# Patient Record
Sex: MALE | Race: WHITE | NOT HISPANIC OR LATINO
[De-identification: names, ages, dates, MRNs, and addresses within clinical notes are randomized per-mention and may not be internally consistent; named-entity substitution may affect disease eponyms.]

---

## 2021-01-12 ENCOUNTER — TRANSCRIPTION ENCOUNTER (OUTPATIENT)
Age: 66
End: 2021-01-12

## 2021-01-12 ENCOUNTER — EMERGENCY (EMERGENCY)
Facility: HOSPITAL | Age: 66
LOS: 1 days | Discharge: HOME | End: 2021-01-12
Attending: EMERGENCY MEDICINE | Admitting: EMERGENCY MEDICINE
Payer: COMMERCIAL

## 2021-01-12 VITALS
DIASTOLIC BLOOD PRESSURE: 75 MMHG | SYSTOLIC BLOOD PRESSURE: 128 MMHG | OXYGEN SATURATION: 97 % | HEART RATE: 75 BPM | TEMPERATURE: 98 F | RESPIRATION RATE: 18 BRPM

## 2021-01-12 VITALS
TEMPERATURE: 98 F | HEART RATE: 84 BPM | DIASTOLIC BLOOD PRESSURE: 75 MMHG | SYSTOLIC BLOOD PRESSURE: 133 MMHG | OXYGEN SATURATION: 95 % | RESPIRATION RATE: 16 BRPM | WEIGHT: 315 LBS | HEIGHT: 74 IN

## 2021-01-12 DIAGNOSIS — R51.9 HEADACHE, UNSPECIFIED: ICD-10-CM

## 2021-01-12 DIAGNOSIS — R05 COUGH: ICD-10-CM

## 2021-01-12 DIAGNOSIS — R50.9 FEVER, UNSPECIFIED: ICD-10-CM

## 2021-01-12 DIAGNOSIS — Z20.822 CONTACT WITH AND (SUSPECTED) EXPOSURE TO COVID-19: ICD-10-CM

## 2021-01-12 DIAGNOSIS — M79.10 MYALGIA, UNSPECIFIED SITE: ICD-10-CM

## 2021-01-12 LAB — SARS-COV-2 RNA SPEC QL NAA+PROBE: DETECTED

## 2021-01-12 PROCEDURE — 99283 EMERGENCY DEPT VISIT LOW MDM: CPT

## 2021-01-12 PROCEDURE — U0005: CPT

## 2021-01-12 PROCEDURE — 82962 GLUCOSE BLOOD TEST: CPT

## 2021-01-12 PROCEDURE — 99284 EMERGENCY DEPT VISIT MOD MDM: CPT

## 2021-01-12 PROCEDURE — U0003: CPT

## 2021-01-12 NOTE — ED PROVIDER NOTE - OBJECTIVE STATEMENT
66 y/o M with PMHx DM, HTN, HLD, anxiety, MICHELLE, presents to the ED c/o substernal burning, body aches, ear fullness, and cough. Pt states he went grocery shopping with a friend 5 days ago and afterwards began to experience what felt like the beginning of a cold. Pt then decided to get a covid test yesterday and was found to be positive. States after he was tested, he began to experience subjective fever and chills, as well as the symptoms noted above. Has been taking tylenol since onset of his symptoms, with some improvement. Pt spoke to his PMD earlier today, who suggested pt come to the ED for antibody treatment. Denies change in BMs or urinary habits, SOB, nausea, vomiting, abdominal pain, or lower extremity numbness or tingling. 64 y/o M with PMHx DM, HTN, HLD, anxiety, MICHELLE, presents to the ED c/o  body aches, congestion, dry cough and headache x 5 days.   Pt had + exposure on Thursday, tested + himself at outpt clinic on Sunday.  Has been taking tylenol since onset of his symptoms, with some improvement.  He had telehealth visit with PMD Dr. Nathan and was told his oxygen was low to come to ED, pt under impression he is here for monoclonal antibodies.  Denies chills, dizziness, fainting, chest pain, sob, abd pain, nvd, urinary sxs, leg pain/swelling, fall/trauma

## 2021-01-12 NOTE — ED PROVIDER NOTE - ATTENDING CONTRIBUTION TO CARE
65 year old male w hx of HTN, HLD, DM presents to ED with concern for body aches, HA and fever over the past 5 days.  Patient notes known COVID exposure of Thursday and later tested positive.  Notes he had tele health visit today and was sent to ED for monoclonal ab infusion.  Patient is taking tylenol with mild improvement in symptoms.  On exam, patient is non toxic in appearance.  HRRR, lungs clear.  Abd soft, NT.  Will check amb o2 sat, and plan to enroll for monoclonal ab approval.  Will dispo accordingly.

## 2021-01-12 NOTE — ED ADULT NURSE NOTE - CAS TRG GENERAL NORM CIRC DET
Strong peripheral pulses/Capillary refill less/equal to 2 seconds

## 2021-01-12 NOTE — ED ADULT NURSE NOTE - CHPI ED NUR SYMPTOMS NEG
no abdominal pain/no chills/no decreased eating/drinking/no diarrhea/no fever/no rash/no shortness of breath/no vomiting

## 2021-01-12 NOTE — ED PROVIDER NOTE - PHYSICAL EXAMINATION
Vitals reviewed  Gen: well appearing, nad, speaking in full sentences  Skin: wwp, no rash/lesions  HEENT: ncat, eomi, mmm  CV: rrr, no audible m/r/g  Resp: symmetrical expansion, ctab, no w/r/r  Abd: nondistended, soft/nt  Ext: FROM throughout, no peripheral edema  Neuro: alert/oriented, no focal deficits, steady gait Vitals reviewed  Gen: well appearing obese M, nad, speaking in full sentences- no dypsnea/hypoxia   Skin: wwp, no rash/lesions  HEENT: ncat, eomi, mmm  CV: rrr, no audible m/r/g  Resp: symmetrical expansion, ctab, no w/r/r  Abd: nondistended, soft, nontender, no rebound/guarding   Ext: FROM throughout, no peripheral edema/calf ttp   Neuro: alert/oriented, no focal deficits, steady gait

## 2021-01-12 NOTE — ED ADULT NURSE NOTE - OBJECTIVE STATEMENT
Pt presents to ED c/o headache. Pt reports headache, body aches, cough. Tested positive for COVID-19 yesterday. Denies chest pain, shortness of breath, fevers, n/v/d. Speaking in clear full sentences, NAD.

## 2021-01-12 NOTE — ED PROVIDER NOTE - NSFOLLOWUPINSTRUCTIONS_ED_ALL_ED_FT
You have covid and need to continue home medications and monitor your oxygen levels.  If your oxygen drops below 92% return to ED.  You were enrolled in monoclonal antibodies- if you qualify you will be contacted.      PLEASE REST AND REMAIN HYDRATED (EG, GATORADE, PEDIALYTE, ETC). TYLENOL AS NEEDED FOR FEVER (>100.4F/>38C)  PLEASE FOLLOW-UP WITH YOUR PCP IN 1-2 DAYS    PLEASE RETURN TO THE EMERGENCY DEPARTMENT IF SOMNOLENCE, CHEST PAIN, SHORTNESS OF BREATH, ABDOMINAL PAIN, VOMITING, OTHER CONCERNING SYMPTOMS    Avoid highly populated and public areas. Avoid using public transportation, ride-sharing, or taxis.   As much as possible, separate yourself from other people in your home. If you can, you should sleep in a room away from other people in your home. Also, you should use a separate bathroom, if available.   Wear the supplied mask whenever you are around other people.   If you have a non-urgent medical appointment, please reschedule for a later date. If the appointment is urgent, please call the healthcare provider first   Wash your hands often with soap and water for at least 15 to 20 seconds or clean your hands with an alcohol-based hand  that contains 60 to 95% alcohol, covering all surfaces of your hands and rubbing them together until they feel dry. Soap and water should be used preferentially if hands are visibly dirty.   Cover your mouth and nose with a tissue when you cough or sneeze. Throw used tissues in a lined trash can; immediately wash your hands.   Avoid touching your eyes, nose, and mouth with your hands.   Avoid sharing personal household items. You should not share dishes, drinking glasses, cups, eating utensils, towels, or bedding with other people or pets in your home. After using these items, they should be washed thoroughly with soap and water.   Clean and disinfect all “high-touch” surfaces every day. High touch surfaces include counters, tabletops, doorknobs, light switches, remote controls, bathroom fixtures, toilets, phones, keyboards, tablets, and bedside tables. Also, clean any surfaces that may have blood, stool, or body fluids on them.

## 2021-01-12 NOTE — ED PROVIDER NOTE - CHPI ED SYMPTOMS NEG
no change in BMs, no change in urinary habits, no nausea, no numbness, no tingling/no abdominal pain/no shortness of breath/no vomiting

## 2021-01-12 NOTE — ED PROVIDER NOTE - PATIENT PORTAL LINK FT
You can access the FollowMyHealth Patient Portal offered by [facility] by registering at the following website: [email or web address]. By joining [facility]’s FollowMyHealth portal, you will also be able to view your health information using other applications (apps) compatible with our system.

## 2021-01-12 NOTE — ED PROVIDER NOTE - CLINICAL SUMMARY MEDICAL DECISION MAKING FREE TEXT BOX
66 y/o M with PMHx DM, HTN, HLD, anxiety, MICHELLE, presents to the ED c/o  body aches, congestion, dry cough and headache x 5 days; known covid +.  pt has no sob/chest pain.  pt afebrile, no hypoxia/dyspnea, ambulatory sat 97%, lungs ctab, abd soft/nt, no peripheral edema.  d/w PMD Dr. Nathan, pt does not meet admission for covid, will enroll for monocolonal antibody infusion- pt/PMD informed he will be contacted if accepted.  he can f/u outpt w/ pmd.  pt given O2 monitor and instructed to return if O2 drops below 92%.  discussed supportive parameters and strict return precautions.

## 2021-01-13 ENCOUNTER — TRANSCRIPTION ENCOUNTER (OUTPATIENT)
Age: 66
End: 2021-01-13

## 2021-01-15 ENCOUNTER — APPOINTMENT (OUTPATIENT)
Dept: DISASTER EMERGENCY | Facility: HOSPITAL | Age: 66
End: 2021-01-15

## 2021-01-15 ENCOUNTER — OUTPATIENT (OUTPATIENT)
Dept: OUTPATIENT SERVICES | Facility: HOSPITAL | Age: 66
LOS: 1 days | End: 2021-01-15
Payer: COMMERCIAL

## 2021-01-15 VITALS
SYSTOLIC BLOOD PRESSURE: 138 MMHG | OXYGEN SATURATION: 100 % | WEIGHT: 315 LBS | HEIGHT: 74 IN | HEART RATE: 80 BPM | DIASTOLIC BLOOD PRESSURE: 79 MMHG | TEMPERATURE: 100 F | RESPIRATION RATE: 16 BRPM

## 2021-01-15 VITALS
RESPIRATION RATE: 16 BRPM | OXYGEN SATURATION: 97 % | TEMPERATURE: 99 F | HEART RATE: 82 BPM | DIASTOLIC BLOOD PRESSURE: 69 MMHG | SYSTOLIC BLOOD PRESSURE: 137 MMHG

## 2021-01-15 DIAGNOSIS — U07.1 COVID-19: ICD-10-CM

## 2021-01-15 PROBLEM — I10 ESSENTIAL (PRIMARY) HYPERTENSION: Chronic | Status: ACTIVE | Noted: 2021-01-12

## 2021-01-15 PROBLEM — G47.33 OBSTRUCTIVE SLEEP APNEA (ADULT) (PEDIATRIC): Chronic | Status: ACTIVE | Noted: 2021-01-12

## 2021-01-15 PROBLEM — E78.5 HYPERLIPIDEMIA, UNSPECIFIED: Chronic | Status: ACTIVE | Noted: 2021-01-12

## 2021-01-15 PROBLEM — E11.9 TYPE 2 DIABETES MELLITUS WITHOUT COMPLICATIONS: Chronic | Status: ACTIVE | Noted: 2021-01-12

## 2021-01-15 PROCEDURE — M0243: CPT

## 2021-01-16 ENCOUNTER — TRANSCRIPTION ENCOUNTER (OUTPATIENT)
Age: 66
End: 2021-01-16

## 2021-01-20 ENCOUNTER — TRANSCRIPTION ENCOUNTER (OUTPATIENT)
Age: 66
End: 2021-01-20

## 2021-01-21 ENCOUNTER — EMERGENCY (EMERGENCY)
Facility: HOSPITAL | Age: 66
LOS: 1 days | Discharge: HOME | End: 2021-01-21
Attending: EMERGENCY MEDICINE | Admitting: EMERGENCY MEDICINE
Payer: COMMERCIAL

## 2021-01-21 VITALS
DIASTOLIC BLOOD PRESSURE: 71 MMHG | OXYGEN SATURATION: 98 % | SYSTOLIC BLOOD PRESSURE: 134 MMHG | RESPIRATION RATE: 18 BRPM | TEMPERATURE: 99 F | HEART RATE: 63 BPM

## 2021-01-21 VITALS
WEIGHT: 315 LBS | TEMPERATURE: 98 F | DIASTOLIC BLOOD PRESSURE: 79 MMHG | HEIGHT: 74 IN | SYSTOLIC BLOOD PRESSURE: 119 MMHG | OXYGEN SATURATION: 97 % | RESPIRATION RATE: 16 BRPM | HEART RATE: 76 BPM

## 2021-01-21 DIAGNOSIS — R05 COUGH: ICD-10-CM

## 2021-01-21 DIAGNOSIS — U07.1 COVID-19: ICD-10-CM

## 2021-01-21 PROCEDURE — 87040 BLOOD CULTURE FOR BACTERIA: CPT

## 2021-01-21 PROCEDURE — U0003: CPT

## 2021-01-21 PROCEDURE — 84484 ASSAY OF TROPONIN QUANT: CPT

## 2021-01-21 PROCEDURE — 99283 EMERGENCY DEPT VISIT LOW MDM: CPT | Mod: 25

## 2021-01-21 PROCEDURE — 80053 COMPREHEN METABOLIC PANEL: CPT

## 2021-01-21 PROCEDURE — 85610 PROTHROMBIN TIME: CPT

## 2021-01-21 PROCEDURE — 82803 BLOOD GASES ANY COMBINATION: CPT

## 2021-01-21 PROCEDURE — 85730 THROMBOPLASTIN TIME PARTIAL: CPT

## 2021-01-21 PROCEDURE — 85025 COMPLETE CBC W/AUTO DIFF WBC: CPT

## 2021-01-21 PROCEDURE — 83605 ASSAY OF LACTIC ACID: CPT

## 2021-01-21 PROCEDURE — 71045 X-RAY EXAM CHEST 1 VIEW: CPT

## 2021-01-21 PROCEDURE — 83880 ASSAY OF NATRIURETIC PEPTIDE: CPT

## 2021-01-21 PROCEDURE — 99284 EMERGENCY DEPT VISIT MOD MDM: CPT

## 2021-01-21 PROCEDURE — 71045 X-RAY EXAM CHEST 1 VIEW: CPT | Mod: 26

## 2021-01-21 PROCEDURE — 36415 COLL VENOUS BLD VENIPUNCTURE: CPT

## 2021-01-21 PROCEDURE — U0005: CPT

## 2021-01-21 RX ORDER — ACETAMINOPHEN 500 MG
650 TABLET ORAL ONCE
Refills: 0 | Status: COMPLETED | OUTPATIENT
Start: 2021-01-21 | End: 2021-01-21

## 2021-01-21 RX ADMIN — Medication 650 MILLIGRAM(S): at 13:34

## 2021-01-21 NOTE — ED PROVIDER NOTE - CLINICAL SUMMARY MEDICAL DECISION MAKING FREE TEXT BOX
65M PMH HTN, DM2, HLD, MICHELLE on CPAP p/w CP. Pt COVID+, on day 11 of symptoms including f/c, cough, body aches, mild diarrhea, nausea. Also w/ intermittent CP/SOB, worse when he goes to sleep. Had outpt Casirivimab/Imdevimab infusion. Came to ED today for persistent symptoms as well as just feeling overwhelmed and being unable to sleep well. Also w/ intermittent diffuse weakness, none currently. Very mild tachypnea, other vitals wnl. Exam as above. Already has home pulse ox.   ddx: Likely 2/2 COVID.   Labs, CXR, observe in ED/reassess.

## 2021-01-21 NOTE — ED PROVIDER NOTE - PATIENT PORTAL LINK FT
You can access the FollowMyHealth Patient Portal offered by [facility] by registering at the following website: [email or web address]. By joining [facility]’s FollowMyHealth portal, you will also be able to view your health information using other applications (apps) compatible with our system.

## 2021-01-21 NOTE — ED PROVIDER NOTE - PROGRESS NOTE DETAILS
[facility]: labs grossly wnl. Pt overall feeling better. Able to ambulate in ED - no hypoxia, does get minimal tachypnea w/ ambulating that resolves w/ rest. Observed in ED for several hrs. Not requiring O2. Body pain is much improved. Clinically no indication for further emergent ED workup or hospitalization at this time. Comfortable for dc, outpt f/u.

## 2021-01-21 NOTE — ED PROVIDER NOTE - EKG #1 DATE/TIME
21-Jan-2021 11:54
Breathing spontaneous and unlabored. Breath sounds clear and equal bilaterally with regular rhythm.

## 2021-01-21 NOTE — ED ADULT NURSE NOTE - OBJECTIVE STATEMENT
Pt presents reporting shortness of breath, insomnia, bilateral arm pain for the last 11 days since being diagnosed with covid19 11 days ago. Pt received monoclonal antibody therapy 8 days ago.

## 2021-01-21 NOTE — ED PROVIDER NOTE - OBJECTIVE STATEMENT
65M PMH HTN, DM2, HLD, MICHELLE on CPAP p/w CP. Pt COVID+, on day 11 of symptoms including f/c, cough, body aches, mild diarrhea, nausea. Also w/ intermittent CP/SOB, worse when he goes to sleep. Had outpt Casirivimab/Imdevimab infusion. Came to ED today for persistent symptoms as well as just feeling overwhelmed and being unable to sleep well. Also w/ intermittent diffuse weakness, none currently.   Denies HA, vision changes, vomiting, rashes, black/bloody stool, focal weakness/numbness, urinary complaints.

## 2021-01-21 NOTE — ED PROVIDER NOTE - NSFOLLOWUPINSTRUCTIONS_ED_ALL_ED_FT
Can take tylenol 650mg every 6hrs as needed for pain or fever.  Stay well hydrated and make sure you eat even if you have a poor appetite.   You may feel slightly worse, that is normal.  You should RETURN IMMEDIATELY to ER for persistent vomit, uncontrolled or worsening abdominal pain, worsening breathing (sometimes manifests as breathing heavier or quicker, unable to speak full sentences), worsening lightheaded, or overall feeling much worse. You may need additional work up or interventions at that time.   It is important to follow up with your primary doctor within 1-2 days.  You are contagious and have coronavirus. Take appropriate precautions - quarantine yourself  (avoid contact with other people, wash hands thoroughly with soap and water) for at least 2 weeks (and until 1 week after fevers resolve or 3 days after all symptoms resolve.)       Viral Respiratory Infection    A viral respiratory infection is an illness that affects parts of the body used for breathing, like the lungs, nose, and throat. It is caused by a germ called a virus. Symptoms can include runny nose, coughing, sneezing, fatigue, body aches, sore throat, fever, or headache. Over the counter medicine can be used to manage the symptoms but the infection typically goes away on its own in 5 to 10 days.     SEEK IMMEDIATE MEDICAL CARE IF YOU HAVE ANY OF THE FOLLOWING SYMPTOMS: shortness of breath, chest pain, fever over 10 days, or lightheadedness/dizziness.     Novel Coronavirus (COVID-19) Patient Discharge Packet    What is a coronavirus?  Coronaviruses are a large family of viruses that cause illnesses ranging from the common cold to more severe diseases such as Middle East Respiratory Syndrome (MERS) and Severe Acute Respiratory Syndrome (SARS).    What is Novel Coronavirus (COVID-19)?  COVID-19 is a new strain of Coronavirus that has not been previously identified in humans. COVID-19 was identified in [facility] in December 2019 (COVID-19). COVID-19 has since been identified outside of [place], in a growing number of countries internationally, including the [place].    Where can I find the most recent information about COVID-19?  The Centers for Disease Control and Prevention (CDC)is closely monitoring the outbreak caused by the COVID-19. For the latest information about COVID-19, visit the CDC website at https://www.cdc.gov/coronavirus/index.html    How are coronaviruses spread?  Coronaviruses can be transmitted from person-to-person, usually after close contact with an infected person, for example, in a household, workplace, or healthcare setting via droplets that become airborne after a cough or sneeze by an affected person. These droplets can then infect a nearby person. It is likely transmission also occurs by touching recently contaminated surfaces.     What are the symptoms of coronavirus infection?  It depends on the virus, but common signs include fever and/or respiratory symptoms such as cough and shortness of breath. In more severe cases, infection can cause pneumonia, severe acute respiratory syndrome, kidney failure and even death. Fortunately, most cases of COVID-19 have an illness no different than the influenza “flu”. With a majority of these patients having mild symptoms and overall mortality which appears to be not much different than the flu.    Is there a treatment for a COVID-19?  There is no specific treatment for disease caused by COVID-19. However, many of the symptoms can be treated based on the patient’s clinical condition. Supportive care for infected persons can be highly effective.    What can I do to protect myself?  Washing  your  hands, covering  your  cough,  and  disinfecting  surfaces are  the  best  precautionary measures.  It  is  also  advisable  to  avoid close  contact  with  anyone  showing  symptoms  of  respiratory illness  such  as  coughing  and  sneezing.  Those  with  symptoms  should  wear  a surgical  mask  when around others.    What can I do to protect those around me?  If  you  have  been  identified  as  someone  who  may  be  infected  with  COVID-19,  we  recommend  you follow the self-isolation procedures outlined below to protect those around you and limit the spread of this virus.     Recommendations for Patients Advised to Self-Isolate for Possible COVID-19 Exposure    We recommend the below precautionary steps from now until 14 days from when you returned from your travel or date of your last known possible contact (An additional 14 days quarantine from when your symptoms resolve):  Do not go to work, school, or public areas. Avoid using public transportation, ride-sharing, or taxis.  As much as possible, separate yourself from other people in your home. If you can, you should stay in a room and away from other people in your home. Also, you should use a separate bathroom, if available.  Wear the supplied mask whenever you are around other people.   If you have a non-urgent medical appointment, please reschedule for a later date. If the appointment is urgent, please call the healthcare provider and tell them that you are on self-isolation for possible COVID-19. This will help the healthcare provider’s office take steps to keep other people from getting infected or exposed. If you can reschedule routine appointments, do so.   Wash your hands often with soap and water for at least 15 to 20 seconds or clean your hands with an alcohol-based hand  that contains 60 to 95% alcohol, covering all surfaces of your hands and rubbing them together until they feel dry. Soap and water should be used preferentially if hands are visibly dirty.  Cover your mouth and nose with a tissue when you cough or sneeze. Throw used tissues in a lined trash can; immediately wash your hands.   Avoid touching your eyes, nose, and mouth with your hands.  Avoid sharing personal household items. You should not share dishes, drinking glasses, cups, eating utensils, towels, or bedding with other people or pets in your home. After using these items, they should be washed thoroughly with soap and water.  Clean and disinfect all “high-touch” surfaces every day. High touch surfaces include counters, tabletops, doorknobs, light switches, remote controls, bathroom fixtures, toilets, phones, keyboards, tablets, and bedside tables. Also, clean any surfaces that may have blood, stool, or body fluids on them.

## 2021-01-21 NOTE — ED ADULT NURSE REASSESSMENT NOTE - NS ED NURSE REASSESS COMMENT FT1
Pt reports moderate improvement to headache and body aches following tylenol. VS WNL. MD [facility] aware.

## 2021-01-21 NOTE — ED ADULT TRIAGE NOTE - CHIEF COMPLAINT QUOTE
pt c/o chest pressure, body aches, numbness to LE, UE, SOB, headaches since 1/12/21, when diagnosed with covid 19. ekg in progress.

## 2021-02-13 ENCOUNTER — INPATIENT (INPATIENT)
Facility: HOSPITAL | Age: 66
LOS: 3 days | Discharge: HOME | DRG: 880 | End: 2021-02-17
Attending: HOSPITALIST | Admitting: HOSPITALIST
Payer: COMMERCIAL

## 2021-02-13 ENCOUNTER — TRANSCRIPTION ENCOUNTER (OUTPATIENT)
Age: 66
End: 2021-02-13

## 2021-02-13 VITALS
RESPIRATION RATE: 20 BRPM | SYSTOLIC BLOOD PRESSURE: 147 MMHG | WEIGHT: 315 LBS | HEART RATE: 70 BPM | DIASTOLIC BLOOD PRESSURE: 74 MMHG | HEIGHT: 74 IN | OXYGEN SATURATION: 98 % | TEMPERATURE: 99 F

## 2021-02-13 DIAGNOSIS — F41.9 ANXIETY DISORDER, UNSPECIFIED: ICD-10-CM

## 2021-02-13 DIAGNOSIS — R07.9 CHEST PAIN, UNSPECIFIED: ICD-10-CM

## 2021-02-13 DIAGNOSIS — I10 ESSENTIAL (PRIMARY) HYPERTENSION: ICD-10-CM

## 2021-02-13 DIAGNOSIS — E11.9 TYPE 2 DIABETES MELLITUS WITHOUT COMPLICATIONS: ICD-10-CM

## 2021-02-13 DIAGNOSIS — G47.33 OBSTRUCTIVE SLEEP APNEA (ADULT) (PEDIATRIC): ICD-10-CM

## 2021-02-13 DIAGNOSIS — U07.1 COVID-19: ICD-10-CM

## 2021-02-13 DIAGNOSIS — E78.5 HYPERLIPIDEMIA, UNSPECIFIED: ICD-10-CM

## 2021-02-13 LAB
CK MB CFR SERPL CALC: 1.2 NG/ML — SIGNIFICANT CHANGE UP (ref 0–6.7)
CK SERPL-CCNC: 89 U/L — SIGNIFICANT CHANGE UP (ref 30–200)
CRP SERPL-MCNC: 0.36 MG/DL — SIGNIFICANT CHANGE UP (ref 0–0.4)
ERYTHROCYTE [SEDIMENTATION RATE] IN BLOOD: 50 MM/HR — HIGH
GLUCOSE BLDC GLUCOMTR-MCNC: 99 MG/DL — SIGNIFICANT CHANGE UP (ref 70–99)
SARS-COV-2 RNA SPEC QL NAA+PROBE: DETECTED
TROPONIN T SERPL-MCNC: <0.01 NG/ML — SIGNIFICANT CHANGE UP (ref 0–0.01)

## 2021-02-13 PROCEDURE — 72125 CT NECK SPINE W/O DYE: CPT | Mod: 26

## 2021-02-13 PROCEDURE — 71275 CT ANGIOGRAPHY CHEST: CPT | Mod: 26,MA

## 2021-02-13 PROCEDURE — 70450 CT HEAD/BRAIN W/O DYE: CPT | Mod: 26

## 2021-02-13 PROCEDURE — 99285 EMERGENCY DEPT VISIT HI MDM: CPT

## 2021-02-13 PROCEDURE — 93010 ELECTROCARDIOGRAM REPORT: CPT | Mod: NC

## 2021-02-13 RX ORDER — AMLODIPINE BESYLATE 2.5 MG/1
5 TABLET ORAL DAILY
Refills: 0 | Status: DISCONTINUED | OUTPATIENT
Start: 2021-02-13 | End: 2021-02-17

## 2021-02-13 RX ORDER — SODIUM CHLORIDE 9 MG/ML
1000 INJECTION, SOLUTION INTRAVENOUS
Refills: 0 | Status: DISCONTINUED | OUTPATIENT
Start: 2021-02-13 | End: 2021-02-17

## 2021-02-13 RX ORDER — AMLODIPINE BESYLATE 2.5 MG/1
1 TABLET ORAL
Qty: 0 | Refills: 0 | DISCHARGE

## 2021-02-13 RX ORDER — INSULIN LISPRO 100/ML
VIAL (ML) SUBCUTANEOUS
Refills: 0 | Status: DISCONTINUED | OUTPATIENT
Start: 2021-02-13 | End: 2021-02-17

## 2021-02-13 RX ORDER — PANTOPRAZOLE SODIUM 20 MG/1
40 TABLET, DELAYED RELEASE ORAL
Refills: 0 | Status: DISCONTINUED | OUTPATIENT
Start: 2021-02-13 | End: 2021-02-17

## 2021-02-13 RX ORDER — ROSUVASTATIN CALCIUM 5 MG/1
1 TABLET ORAL
Qty: 0 | Refills: 0 | DISCHARGE

## 2021-02-13 RX ORDER — ESCITALOPRAM OXALATE 10 MG/1
1 TABLET, FILM COATED ORAL
Qty: 0 | Refills: 0 | DISCHARGE

## 2021-02-13 RX ORDER — DEXTROSE 50 % IN WATER 50 %
25 SYRINGE (ML) INTRAVENOUS ONCE
Refills: 0 | Status: DISCONTINUED | OUTPATIENT
Start: 2021-02-13 | End: 2021-02-17

## 2021-02-13 RX ORDER — ENOXAPARIN SODIUM 100 MG/ML
40 INJECTION SUBCUTANEOUS DAILY
Refills: 0 | Status: DISCONTINUED | OUTPATIENT
Start: 2021-02-13 | End: 2021-02-13

## 2021-02-13 RX ORDER — ACETAMINOPHEN 500 MG
650 TABLET ORAL EVERY 6 HOURS
Refills: 0 | Status: DISCONTINUED | OUTPATIENT
Start: 2021-02-13 | End: 2021-02-17

## 2021-02-13 RX ORDER — ESCITALOPRAM OXALATE 10 MG/1
10 TABLET, FILM COATED ORAL DAILY
Refills: 0 | Status: DISCONTINUED | OUTPATIENT
Start: 2021-02-13 | End: 2021-02-17

## 2021-02-13 RX ORDER — GABAPENTIN 400 MG/1
300 CAPSULE ORAL AT BEDTIME
Refills: 0 | Status: DISCONTINUED | OUTPATIENT
Start: 2021-02-13 | End: 2021-02-14

## 2021-02-13 RX ORDER — DEXTROSE 50 % IN WATER 50 %
15 SYRINGE (ML) INTRAVENOUS ONCE
Refills: 0 | Status: DISCONTINUED | OUTPATIENT
Start: 2021-02-13 | End: 2021-02-17

## 2021-02-13 RX ORDER — GLUCAGON INJECTION, SOLUTION 0.5 MG/.1ML
1 INJECTION, SOLUTION SUBCUTANEOUS ONCE
Refills: 0 | Status: DISCONTINUED | OUTPATIENT
Start: 2021-02-13 | End: 2021-02-17

## 2021-02-13 RX ORDER — QUETIAPINE FUMARATE 200 MG/1
25 TABLET, FILM COATED ORAL AT BEDTIME
Refills: 0 | Status: DISCONTINUED | OUTPATIENT
Start: 2021-02-13 | End: 2021-02-14

## 2021-02-13 RX ORDER — ALPRAZOLAM 0.25 MG
0.25 TABLET ORAL EVERY 8 HOURS
Refills: 0 | Status: DISCONTINUED | OUTPATIENT
Start: 2021-02-13 | End: 2021-02-14

## 2021-02-13 RX ORDER — ENOXAPARIN SODIUM 100 MG/ML
40 INJECTION SUBCUTANEOUS EVERY 12 HOURS
Refills: 0 | Status: DISCONTINUED | OUTPATIENT
Start: 2021-02-13 | End: 2021-02-17

## 2021-02-13 RX ORDER — ASPIRIN/CALCIUM CARB/MAGNESIUM 324 MG
325 TABLET ORAL ONCE
Refills: 0 | Status: COMPLETED | OUTPATIENT
Start: 2021-02-13 | End: 2021-02-13

## 2021-02-13 RX ORDER — DEXTROSE 50 % IN WATER 50 %
12.5 SYRINGE (ML) INTRAVENOUS ONCE
Refills: 0 | Status: DISCONTINUED | OUTPATIENT
Start: 2021-02-13 | End: 2021-02-17

## 2021-02-13 RX ORDER — METFORMIN HYDROCHLORIDE 850 MG/1
1 TABLET ORAL
Qty: 0 | Refills: 0 | DISCHARGE

## 2021-02-13 RX ORDER — OMEPRAZOLE 10 MG/1
1 CAPSULE, DELAYED RELEASE ORAL
Qty: 0 | Refills: 0 | DISCHARGE

## 2021-02-13 RX ORDER — KETOROLAC TROMETHAMINE 30 MG/ML
15 SYRINGE (ML) INJECTION ONCE
Refills: 0 | Status: DISCONTINUED | OUTPATIENT
Start: 2021-02-13 | End: 2021-02-13

## 2021-02-13 RX ORDER — ATORVASTATIN CALCIUM 80 MG/1
40 TABLET, FILM COATED ORAL AT BEDTIME
Refills: 0 | Status: DISCONTINUED | OUTPATIENT
Start: 2021-02-13 | End: 2021-02-17

## 2021-02-13 RX ADMIN — AMLODIPINE BESYLATE 5 MILLIGRAM(S): 2.5 TABLET ORAL at 19:03

## 2021-02-13 RX ADMIN — QUETIAPINE FUMARATE 25 MILLIGRAM(S): 200 TABLET, FILM COATED ORAL at 22:35

## 2021-02-13 RX ADMIN — Medication 15 MILLIGRAM(S): at 13:45

## 2021-02-13 RX ADMIN — Medication 325 MILLIGRAM(S): at 17:25

## 2021-02-13 RX ADMIN — ENOXAPARIN SODIUM 40 MILLIGRAM(S): 100 INJECTION SUBCUTANEOUS at 22:00

## 2021-02-13 RX ADMIN — GABAPENTIN 300 MILLIGRAM(S): 400 CAPSULE ORAL at 22:35

## 2021-02-13 RX ADMIN — Medication 0.25 MILLIGRAM(S): at 19:11

## 2021-02-13 RX ADMIN — ATORVASTATIN CALCIUM 40 MILLIGRAM(S): 80 TABLET, FILM COATED ORAL at 22:35

## 2021-02-13 NOTE — ED ADULT NURSE NOTE - OBJECTIVE STATEMENT
Pt c/o chest pain worse when lying down, sob, headache, and intermittent bilateral arm numbness, lightheadedness, and nausea for 4 weeks. Pt denies numbness, nausea at this time. Pt ambulating with steady gait. EKG done. Fbg 138.  Pt reports episodes of vomiting, nausea.  Denies diarrhea, fever, chills, abd pain.  Pt is alert and oriented x3, and is extremely anxious.  Pt currently speaking in complete, clear sentences, ambulatory with even gait.

## 2021-02-13 NOTE — H&P ADULT - NSHPLABSRESULTS_GEN_ALL_CORE
13.4   5.66  )-----------( 227      ( 13 Feb 2021 12:25 )             41.2       143  |  108  |  14  ----------------------------<  155<H>  4.1   |  23  |  0.94    Ca    9.2      13 Feb 2021 12:25  Mg     1.8     02-13    TPro  7.7  /  Alb  4.5  /  TBili  0.6  /  DBili  x   /  AST  33  /  ALT  44  /  AlkPhos  62  02-13      PT/INR - ( 13 Feb 2021 12:25 )   PT: 12.9 sec;   INR: 1.08          PTT - ( 13 Feb 2021 12:25 )  PTT:31.6 sec    CARDIAC MARKERS ( 13 Feb 2021 12:25 )  x     / <0.01 ng/mL / 95 U/L / x     / 1.2 ng/mL      EKG: non-ischemic; NSR

## 2021-02-13 NOTE — H&P ADULT - NSICDXPASTMEDICALHX_GEN_ALL_CORE_FT
PAST MEDICAL HISTORY:  DM (diabetes mellitus)     Hyperlipidemia     Hypertension     MICHELLE (obstructive sleep apnea)

## 2021-02-13 NOTE — H&P ADULT - PROBLEM SELECTOR PLAN 7
- On CPAP at night – continue.      DVT ppx: Lovenox SQ  Dispo: ischemic eval tuesday; monitor/work up

## 2021-02-13 NOTE — ED PROVIDER NOTE - NS ED ROS FT
Constitutional: No fever or chills.   Eyes: No pain, blurry vision, or discharge.  ENMT: No hearing changes, pain, discharge or infections. + neck soreness  Cardiac: + Intermittent SOB.  No edema.   Respiratory: + intermittent SOB, + Recent COVID infection.  No cough or respiratory distress. No hemoptysis. No history of asthma or RAD.  GI: No nausea, vomiting, diarrhea or abdominal pain.  : No dysuria, frequency or burning.  MS: + myalgia, no muscle weakness, joint pain or back pain.  Neuro: No headache or weakness. No LOC.  Skin: No skin rash.   Endocrine: No history of thyroid disease or diabetes.  Except as documented in the HPI, all other systems are negative.

## 2021-02-13 NOTE — H&P ADULT - PROBLEM SELECTOR PLAN 2
- Pt had COVID diagnosed January 21st.   - No O2 supplementation requirement at this time.   - COVID-PCR (+) on admit 2/13/21 – asymptomatic, likely still shedding virus.   - F/U D-dimer, CRP, ESR.   - Monitor.

## 2021-02-13 NOTE — H&P ADULT - PROBLEM SELECTOR PLAN 5
- Home med: Crestor 10mg PO QHS.   - Continue therapeutic interchange Atorvastatin 40mg PO QHS.   - F/U lipid panel.

## 2021-02-13 NOTE — H&P ADULT - NEUROLOGICAL DETAILS
alert and oriented x 3/responds to pain/cranial nerves intact/no spontaneous movement/normal strength

## 2021-02-13 NOTE — ED PROVIDER NOTE - CLINICAL SUMMARY MEDICAL DECISION MAKING FREE TEXT BOX
Patient presents to ED with concern for ongoing cp, intermittent sob, headache following covid in Jan 2021.  Patient non toxic in appearance.  EKG NSR.  Labs reviewed, trop negative.  CTA completed and negative for PE.  CT head without evidence of acute process.  No focal neurologic deficits.  Attempt is made to contact patient's PCP as patient states he does not feel well enough to go home, though unable to get in contact with patient's primary team.  On reassessment, patient is requesting admission because he is concerned for ongoing chest pain.  Suspect possible component of anxiety, however in setting of multiple cardiac risk factors and no recent cardiac evaluation, case is discussed with cardiologist on call, Dr. Mckeon, who is agreeable to plan for admission to cardiac tele with close monitoring and further evaluation.  Patient is aware of plan and in agreement.  Will admit at this time.

## 2021-02-13 NOTE — H&P ADULT - RS GEN PE MLT RESP DETAILS PC
normal/airway patent/breath sounds equal/good air movement/respirations non-labored/clear to auscultation bilaterally/no rales/no wheezes

## 2021-02-13 NOTE — ED PROVIDER NOTE - PHYSICAL EXAMINATION
VITAL SIGNS: I have reviewed nursing notes and confirm.  CONSTITUTIONAL: Well-developed; well-nourished; in no acute distress.   SKIN:  Warm and dry, no acute rash.   HEAD:  normocephalic, atraumatic.  EYES: PERRL.  EOM intact; conjunctiva and sclera clear.  ENT: No nasal discharge; airway clear.   NECK: Supple; + mild tenderness to low paracervical musculature, no midline cervical spine pain/tenderness/stepoff/deformity.   CARD: S1, S2 normal; no murmurs, gallops, or rubs. Regular rate and rhythm.   RESP:  Clear to auscultation b/l, no wheezes, rales or rhonchi.  ABD: Normal bowel sounds; soft; non-distended; non-tender; no guarding/rebound.  MSK:  No midline thoracic or lumbar spine pain/tenderness/stepoff/deformity.   EXT: Normal ROM. No clubbing, cyanosis or edema. 2+ pulses to b/l ue/le.  NEURO: Alert, oriented, grossly unremarkable.  5/5 strength x 4 extremities against gravity and external force.  No drift x 4 extremities.  Sensation intact and symmetric x 4 extremities.  No facial asymmetry.    PSYCH: Cooperative, mood and affect appropriate.

## 2021-02-13 NOTE — ED PROVIDER NOTE - OBJECTIVE STATEMENT
65 year old male with history of HTN, DM2, HLD, MICHELLE on CPAP, recent COVID + in Jan 2021 presents to ED with concern for persistent chest pain and intermittent shortness of breath with inability to sleep and feeling anxious.  Patient notes he followed up with his PCP and was told his symptoms were due to anxiety, however patient does not feel improved and requests further evaluation.  He also complains of persistent headache and body aches.  Patient denies associated fever, chills, shortness of breath or chest pain currently, abdominal pain, nausea, emesis, changes to bowel movements, calf pain, lower extremity swelling, rashes, recent travel or any additional acute complaints or concerns at this time.

## 2021-02-13 NOTE — H&P ADULT - NS NEC GEN PE MLT EXAM PC
Patient Education        Learning About Coronary Artery Disease (CAD)  What is coronary artery disease? Coronary artery disease (CAD) occurs when plaque builds up in the arteries that bring oxygen-rich blood to your heart. Plaque is a fatty substance made of cholesterol, calcium, and other substances in the blood. This process is called hardening of the arteries, or atherosclerosis. What happens when you have coronary artery disease? · Plaque may narrow the coronary arteries. Narrowed arteries cause poor blood flow. This can lead to angina symptoms such as chest pain or discomfort. If blood flow is completely blocked, you could have a heart attack. · You can slow CAD and reduce the risk of future problems by making changes in your lifestyle. These include quitting smoking and eating heart-healthy foods. · Treatments for CAD, along with changes in your lifestyle, can help you live a longer and healthier life. How can you prevent coronary artery disease? · Do not smoke. It may be the best thing you can do to prevent heart disease. If you need help quitting, talk to your doctor about stop-smoking programs and medicines. These can increase your chances of quitting for good. · Be active. Get at least 30 minutes of exercise on most days of the week. Walking is a good choice. You also may want to do other activities, such as running, swimming, cycling, or playing tennis or team sports. · Eat heart-healthy foods. Eat more fruits and vegetables and less foods that contain saturated and trans fats. Limit alcohol, sodium, and sweets. · Stay at a healthy weight. Lose weight if you need to. · Manage other health problems such as diabetes, high blood pressure, and high cholesterol. · Manage stress. Stress can hurt your heart. To keep stress low, talk about your problems and feelings. Don't keep your feelings hidden. · If you have talked about it with your doctor, take a low-dose aspirin every day.  Aspirin can detailed exam

## 2021-02-13 NOTE — H&P ADULT - PROBLEM SELECTOR PLAN 1
- Pt presents w/ CP; intermittent, independent of exertion, sharp, midsternal, non-radiating; has been occurring intermittently for a few months.   - CP free at this time; HD stable; lying comfortable in no acute distress; physical exam benign.   - Trop (-) x1 and EKG non-ischemic; f/u serial trop/EKG.   - ESR/CRP ordered – F/U  - Telemetry.   - Monitor vitals.   - Plan for ischemic eval – likely Tuesday 2/16/21.   - ECHO ordered.

## 2021-02-13 NOTE — ED ADULT TRIAGE NOTE - CHIEF COMPLAINT QUOTE
Pt c/o chest pain worse when lying down, sob, headache, and intermittent bilateral arm numbness, lightheadedness, and nausea for 4 weeks. Pt denies numbness, nausea at this time. Pt ambulating with steady gait. EKG done. Fbg 138.

## 2021-02-13 NOTE — ED CLERICAL - NS ED CLERK NOTE PRE-ARRIVAL INFORMATION; ADDITIONAL PRE-ARRIVAL INFORMATION
This patient recently had Antibody Infusion for COVID and has active care navigation. This patient can be followed up by the care navigation team within 24 hours. To arrange close follow-up or to obtain additional clinical information about this patient, please call the contact number above

## 2021-02-13 NOTE — H&P ADULT - HISTORY OF PRESENT ILLNESS
Pt is a 66yo M w/ PMHx of HTN, DM2, HLD, MICHELLE (on CPAP), recent COVID (+) in January 2021 (not hospitalized) who presented to [facility] ED endorsing chest pain that he is concerned about. In speaking to patient, he is endorsing multiple vague complaints, and patient a poor historian. Pt noted to be very anxious. Patient appears in no acute distress at this time, but reporting     In ED, vital signs stable. In ED, labs: WBC 5.66, Hgb/Hct 13.4/41.2, Plt Cnt 227; Na 143, K 4.1, BUN/Cr 14/0.94, Mg 1.8, Trop T (-), COVID-PCR (+). EKG non-ischemic. CT Head no acute ICH or large territorial infarction, age-indeterminate infarct in R frontal lobe, chronic appearing. CT Neck no acute fractures, small central disc protrusion and mild central canal stenosis at C4/C5. CT PE protocol (-) for PE.     In ED, pt given Ketorolac 15 IV x1 and Aspirin 325mg PO x1.     Patient admitted to cardiology for further work up.

## 2021-02-13 NOTE — H&P ADULT - PROBLEM SELECTOR PLAN 3
- Pt very anxious.   - Reports he saw his PCP for his concerns, and his PCP said it was anxiety.   - CONT: home Quetiapine and Alprazolam.   - Consider psych eval if patient persistent severely anxious.

## 2021-02-13 NOTE — ED PROVIDER NOTE - DOMESTIC TRAVEL HIGH RISK QUESTION
Spoke with pt today in regards to a PA for his Contour next test strips and he stated that his insurance is requesting for him to use the [employer] brand testing supplies. Pt was made aware that his insurance may offer a special program for their diabetic pts that will allow their testing supplies to be free. Pt was encouraged to call his insurance to verify this information. Pt stated that he would call his insurance today and will let is know the status at his upcoming appt this Friday. No

## 2021-02-14 DIAGNOSIS — F41.1 GENERALIZED ANXIETY DISORDER: ICD-10-CM

## 2021-02-14 LAB
A1C WITH ESTIMATED AVERAGE GLUCOSE RESULT: 7.4 % — HIGH (ref 4–5.6)
ALBUMIN SERPL ELPH-MCNC: 3.9 G/DL — SIGNIFICANT CHANGE UP (ref 3.3–5)
ALP SERPL-CCNC: 64 U/L — SIGNIFICANT CHANGE UP (ref 40–120)
ALT FLD-CCNC: 60 U/L — HIGH (ref 10–45)
ANION GAP SERPL CALC-SCNC: 10 MMOL/L — SIGNIFICANT CHANGE UP (ref 5–17)
AST SERPL-CCNC: 41 U/L — HIGH (ref 10–40)
BILIRUB SERPL-MCNC: 0.6 MG/DL — SIGNIFICANT CHANGE UP (ref 0.2–1.2)
BUN SERPL-MCNC: 9 MG/DL — SIGNIFICANT CHANGE UP (ref 7–23)
CALCIUM SERPL-MCNC: 8.8 MG/DL — SIGNIFICANT CHANGE UP (ref 8.4–10.5)
CHLORIDE SERPL-SCNC: 105 MMOL/L — SIGNIFICANT CHANGE UP (ref 96–108)
CHOLEST SERPL-MCNC: 169 MG/DL — SIGNIFICANT CHANGE UP
CO2 SERPL-SCNC: 25 MMOL/L — SIGNIFICANT CHANGE UP (ref 22–31)
CREAT SERPL-MCNC: 0.81 MG/DL — SIGNIFICANT CHANGE UP (ref 0.5–1.3)
CRP SERPL-MCNC: 0.33 MG/DL — SIGNIFICANT CHANGE UP (ref 0–0.4)
D DIMER BLD IA.RAPID-MCNC: <150 NG/ML DDU — SIGNIFICANT CHANGE UP
ESTIMATED AVERAGE GLUCOSE: 166 MG/DL — HIGH (ref 68–114)
FERRITIN SERPL-MCNC: 275 NG/ML — SIGNIFICANT CHANGE UP (ref 30–400)
GLUCOSE BLDC GLUCOMTR-MCNC: 113 MG/DL — HIGH (ref 70–99)
GLUCOSE BLDC GLUCOMTR-MCNC: 123 MG/DL — HIGH (ref 70–99)
GLUCOSE BLDC GLUCOMTR-MCNC: 133 MG/DL — HIGH (ref 70–99)
GLUCOSE BLDC GLUCOMTR-MCNC: 138 MG/DL — HIGH (ref 70–99)
GLUCOSE SERPL-MCNC: 135 MG/DL — HIGH (ref 70–99)
HCT VFR BLD CALC: 41.1 % — SIGNIFICANT CHANGE UP (ref 39–50)
HCV AB S/CO SERPL IA: 0.17 S/CO — SIGNIFICANT CHANGE UP
HCV AB SERPL-IMP: SIGNIFICANT CHANGE UP
HDLC SERPL-MCNC: 35 MG/DL — LOW
HGB BLD-MCNC: 13.1 G/DL — SIGNIFICANT CHANGE UP (ref 13–17)
LIPID PNL WITH DIRECT LDL SERPL: 107 MG/DL — HIGH
MAGNESIUM SERPL-MCNC: 1.9 MG/DL — SIGNIFICANT CHANGE UP (ref 1.6–2.6)
MCHC RBC-ENTMCNC: 29.4 PG — SIGNIFICANT CHANGE UP (ref 27–34)
MCHC RBC-ENTMCNC: 31.9 GM/DL — LOW (ref 32–36)
MCV RBC AUTO: 92.4 FL — SIGNIFICANT CHANGE UP (ref 80–100)
NON HDL CHOLESTEROL: 134 MG/DL — HIGH
NRBC # BLD: 0 /100 WBCS — SIGNIFICANT CHANGE UP (ref 0–0)
PLATELET # BLD AUTO: 205 K/UL — SIGNIFICANT CHANGE UP (ref 150–400)
POTASSIUM SERPL-MCNC: 3.8 MMOL/L — SIGNIFICANT CHANGE UP (ref 3.5–5.3)
POTASSIUM SERPL-SCNC: 3.8 MMOL/L — SIGNIFICANT CHANGE UP (ref 3.5–5.3)
PROT SERPL-MCNC: 7.1 G/DL — SIGNIFICANT CHANGE UP (ref 6–8.3)
RBC # BLD: 4.45 M/UL — SIGNIFICANT CHANGE UP (ref 4.2–5.8)
RBC # FLD: 13.9 % — SIGNIFICANT CHANGE UP (ref 10.3–14.5)
SODIUM SERPL-SCNC: 140 MMOL/L — SIGNIFICANT CHANGE UP (ref 135–145)
TRIGL SERPL-MCNC: 137 MG/DL — SIGNIFICANT CHANGE UP
TROPONIN T SERPL-MCNC: <0.01 NG/ML — SIGNIFICANT CHANGE UP (ref 0–0.01)
WBC # BLD: 4.61 K/UL — SIGNIFICANT CHANGE UP (ref 3.8–10.5)
WBC # FLD AUTO: 4.61 K/UL — SIGNIFICANT CHANGE UP (ref 3.8–10.5)

## 2021-02-14 PROCEDURE — 99221 1ST HOSP IP/OBS SF/LOW 40: CPT

## 2021-02-14 PROCEDURE — 99222 1ST HOSP IP/OBS MODERATE 55: CPT

## 2021-02-14 RX ORDER — MAGNESIUM OXIDE 400 MG ORAL TABLET 241.3 MG
400 TABLET ORAL ONCE
Refills: 0 | Status: COMPLETED | OUTPATIENT
Start: 2021-02-14 | End: 2021-02-14

## 2021-02-14 RX ORDER — CLONAZEPAM 1 MG
0.5 TABLET ORAL EVERY 12 HOURS
Refills: 0 | Status: DISCONTINUED | OUTPATIENT
Start: 2021-02-14 | End: 2021-02-17

## 2021-02-14 RX ORDER — POTASSIUM CHLORIDE 20 MEQ
20 PACKET (EA) ORAL ONCE
Refills: 0 | Status: COMPLETED | OUTPATIENT
Start: 2021-02-14 | End: 2021-02-14

## 2021-02-14 RX ORDER — GABAPENTIN 400 MG/1
600 CAPSULE ORAL AT BEDTIME
Refills: 0 | Status: DISCONTINUED | OUTPATIENT
Start: 2021-02-14 | End: 2021-02-17

## 2021-02-14 RX ORDER — ASPIRIN/CALCIUM CARB/MAGNESIUM 324 MG
81 TABLET ORAL DAILY
Refills: 0 | Status: DISCONTINUED | OUTPATIENT
Start: 2021-02-14 | End: 2021-02-17

## 2021-02-14 RX ORDER — ALPRAZOLAM 0.25 MG
0.25 TABLET ORAL EVERY 6 HOURS
Refills: 0 | Status: DISCONTINUED | OUTPATIENT
Start: 2021-02-14 | End: 2021-02-14

## 2021-02-14 RX ORDER — ACETAMINOPHEN 500 MG
1000 TABLET ORAL ONCE
Refills: 0 | Status: COMPLETED | OUTPATIENT
Start: 2021-02-14 | End: 2021-02-14

## 2021-02-14 RX ADMIN — Medication 650 MILLIGRAM(S): at 09:44

## 2021-02-14 RX ADMIN — ATORVASTATIN CALCIUM 40 MILLIGRAM(S): 80 TABLET, FILM COATED ORAL at 21:20

## 2021-02-14 RX ADMIN — Medication 400 MILLIGRAM(S): at 12:58

## 2021-02-14 RX ADMIN — ESCITALOPRAM OXALATE 10 MILLIGRAM(S): 10 TABLET, FILM COATED ORAL at 12:00

## 2021-02-14 RX ADMIN — Medication 0.5 MILLIGRAM(S): at 17:43

## 2021-02-14 RX ADMIN — MAGNESIUM OXIDE 400 MG ORAL TABLET 400 MILLIGRAM(S): 241.3 TABLET ORAL at 13:18

## 2021-02-14 RX ADMIN — Medication 81 MILLIGRAM(S): at 12:58

## 2021-02-14 RX ADMIN — Medication 650 MILLIGRAM(S): at 17:43

## 2021-02-14 RX ADMIN — Medication 0.25 MILLIGRAM(S): at 08:59

## 2021-02-14 RX ADMIN — PANTOPRAZOLE SODIUM 40 MILLIGRAM(S): 20 TABLET, DELAYED RELEASE ORAL at 06:28

## 2021-02-14 RX ADMIN — Medication 0.25 MILLIGRAM(S): at 13:18

## 2021-02-14 RX ADMIN — ENOXAPARIN SODIUM 40 MILLIGRAM(S): 100 INJECTION SUBCUTANEOUS at 09:44

## 2021-02-14 RX ADMIN — Medication 20 MILLIEQUIVALENT(S): at 13:18

## 2021-02-14 RX ADMIN — GABAPENTIN 600 MILLIGRAM(S): 400 CAPSULE ORAL at 21:20

## 2021-02-14 RX ADMIN — AMLODIPINE BESYLATE 5 MILLIGRAM(S): 2.5 TABLET ORAL at 06:27

## 2021-02-14 RX ADMIN — ENOXAPARIN SODIUM 40 MILLIGRAM(S): 100 INJECTION SUBCUTANEOUS at 21:20

## 2021-02-14 NOTE — PROGRESS NOTE ADULT - SUBJECTIVE AND OBJECTIVE BOX
Interventional Cardiology PA Adult Progress Note    Subjective Assessment:  	  MEDICATIONS:  amLODIPine   Tablet 5 milliGRAM(s) Oral daily  acetaminophen   Tablet .. 650 milliGRAM(s) Oral every 6 hours PRN  ALPRAZolam 0.25 milliGRAM(s) Oral every 8 hours PRN  escitalopram 10 milliGRAM(s) Oral daily  gabapentin 300 milliGRAM(s) Oral at bedtime  QUEtiapine 25 milliGRAM(s) Oral at bedtime  pantoprazole    Tablet 40 milliGRAM(s) Oral before breakfast  atorvastatin 40 milliGRAM(s) Oral at bedtime  dextrose 40% Gel 15 Gram(s) Oral once  dextrose 50% Injectable 25 Gram(s) IV Push once  dextrose 50% Injectable 12.5 Gram(s) IV Push once  dextrose 50% Injectable 25 Gram(s) IV Push once  glucagon  Injectable 1 milliGRAM(s) IntraMuscular once  insulin lispro (ADMELOG) corrective regimen sliding scale   SubCutaneous Before meals and at bedtime  dextrose 5%. 1000 milliLiter(s) IV Continuous <Continuous>  dextrose 5%. 1000 milliLiter(s) IV Continuous <Continuous>  enoxaparin Injectable 40 milliGRAM(s) SubCutaneous every 12 hours        PHYSICAL EXAM:  TELEMETRY:  T(C): 36.6 (02-14-21 @ 05:29), Max: 37.1 (02-13-21 @ 11:54)  HR: 56 (02-14-21 @ 05:29) (54 - 79)  BP: 127/77 (02-14-21 @ 05:29) (107/59 - 151/85)  RR: 17 (02-14-21 @ 05:29) (15 - 20)  SpO2: 98% (02-14-21 @ 05:29) (97% - 99%)  Wt(kg): --  I&O's Summary    13 Feb 2021 07:01  -  14 Feb 2021 07:00  --------------------------------------------------------  IN: 200 mL / OUT: 0 mL / NET: 200 mL      Height (cm): 188 (02-13 @ 11:54)  Weight (kg): 157.4 (02-13 @ 11:54)  BMI (kg/m2): 44.5 (02-13 @ 11:54)  BSA (m2): 2.75 (02-13 @ 11:54)                                        Appearance: Normal	  HEENT:   Normal oral mucosa, PERRL, EOMI	  Neck: Supple, - JVD; Carotid Bruit   Cardiovascular: Normal S1 S2, No JVD, No murmurs,   Respiratory: Lungs clear to auscultation  Gastrointestinal:  Soft, Non-tender, + BS	  Skin: No rashes, No ecchymoses, No cyanosis  Extremities: Normal range of motion, No clubbing, cyanosis or edema  Vascular: Peripheral pulses palpable 2+ bilaterally  Neurologic: Non-focal  Psychiatry: A & O x 3, Mood & affect appropriate        LABS:	 	  CARDIAC MARKERS:                        13.1   4.61  )-----------( 205      ( 14 Feb 2021 07:12 )             41.1     140  |  105  |  9   ----------------------------<  135<H>  3.8   |  25  |  0.81    Ca    8.8      14 Feb 2021 07:12  Mg     1.9     02-14    TPro  7.1  /  Alb  3.9  /  TBili  0.6  /  DBili  x   /  AST  41<H>  /  ALT  60<H>  /  AlkPhos  64  02-14    PT/INR - ( 13 Feb 2021 12:25 )   PT: 12.9 sec;   INR: 1.08          PTT - ( 13 Feb 2021 12:25 )  PTT:31.6 sec     Interventional Cardiology PA Adult Progress Note    Subjective Assessment: Pt very anxious and reports having panic attacks.   	  MEDICATIONS:  amLODIPine   Tablet 5 milliGRAM(s) Oral daily  acetaminophen   Tablet .. 650 milliGRAM(s) Oral every 6 hours PRN  ALPRAZolam 0.25 milliGRAM(s) Oral every 8 hours PRN  escitalopram 10 milliGRAM(s) Oral daily  gabapentin 300 milliGRAM(s) Oral at bedtime  QUEtiapine 25 milliGRAM(s) Oral at bedtime  pantoprazole    Tablet 40 milliGRAM(s) Oral before breakfast  atorvastatin 40 milliGRAM(s) Oral at bedtime  dextrose 40% Gel 15 Gram(s) Oral once  dextrose 50% Injectable 25 Gram(s) IV Push once  dextrose 50% Injectable 12.5 Gram(s) IV Push once  dextrose 50% Injectable 25 Gram(s) IV Push once  glucagon  Injectable 1 milliGRAM(s) IntraMuscular once  insulin lispro (ADMELOG) corrective regimen sliding scale   SubCutaneous Before meals and at bedtime  dextrose 5%. 1000 milliLiter(s) IV Continuous <Continuous>  dextrose 5%. 1000 milliLiter(s) IV Continuous <Continuous>  enoxaparin Injectable 40 milliGRAM(s) SubCutaneous every 12 hours        PHYSICAL EXAM:  TELEMETRY:  T(C): 36.6 (02-14-21 @ 05:29), Max: 37.1 (02-13-21 @ 11:54)  HR: 56 (02-14-21 @ 05:29) (54 - 79)  BP: 127/77 (02-14-21 @ 05:29) (107/59 - 151/85)  RR: 17 (02-14-21 @ 05:29) (15 - 20)  SpO2: 98% (02-14-21 @ 05:29) (97% - 99%)  Wt(kg): --  I&O's Summary    13 Feb 2021 07:01  -  14 Feb 2021 07:00  --------------------------------------------------------  IN: 200 mL / OUT: 0 mL / NET: 200 mL      Height (cm): 188 (02-13 @ 11:54)  Weight (kg): 157.4 (02-13 @ 11:54)  BMI (kg/m2): 44.5 (02-13 @ 11:54)  BSA (m2): 2.75 (02-13 @ 11:54)                                        Appearance: Normal	  HEENT:   Normal oral mucosa, PERRL, EOMI	  Neck: Supple, - JVD; Carotid Bruit   Cardiovascular: Normal S1 S2, No JVD, No murmurs,   Respiratory: Lungs clear to auscultation  Gastrointestinal:  Soft, Non-tender, + BS	  Skin: No rashes, No ecchymoses, No cyanosis  Extremities: Normal range of motion, No clubbing, cyanosis or edema  Vascular: Peripheral pulses palpable 2+ bilaterally  Neurologic: Non-focal  Psychiatry: A & O x 3, Mood & affect appropriate        LABS:	 	  CARDIAC MARKERS:                        13.1   4.61  )-----------( 205      ( 14 Feb 2021 07:12 )             41.1     140  |  105  |  9   ----------------------------<  135<H>  3.8   |  25  |  0.81    Ca    8.8      14 Feb 2021 07:12  Mg     1.9     02-14    TPro  7.1  /  Alb  3.9  /  TBili  0.6  /  DBili  x   /  AST  41<H>  /  ALT  60<H>  /  AlkPhos  64  02-14    PT/INR - ( 13 Feb 2021 12:25 )   PT: 12.9 sec;   INR: 1.08          PTT - ( 13 Feb 2021 12:25 )  PTT:31.6 sec

## 2021-02-14 NOTE — BEHAVIORAL HEALTH ASSESSMENT NOTE - SUMMARY
65 year old male with history of anxiety symptoms presenting to ED with chest pain and multiple somatic complaints, vague in nature potentially related to anxiety but etiology pending cardiac work up

## 2021-02-14 NOTE — PROGRESS NOTE ADULT - PROBLEM SELECTOR PLAN 3
- Pt very anxious.   - Reports he saw his PCP for his concerns, and his PCP said it was anxiety.   - CONT: home Quetiapine and Alprazolam.   - Consider psych eval if patient persistent severely anxious. - Pt very anxious.   - Reports he saw his PCP for his concerns, and his PCP said it was anxiety.   - Pt reporting panic attacks.   - PSYCH consulted - Klonopin 0.5mg PO q12hrs, Gabapentin 600mg PO QHS, Ativan 2mg q6hrs PRN for breakthrough anxiety, discontinue Seroquel (consider restarting PRN for refractory anxiety).   - Continue following psych recs.

## 2021-02-14 NOTE — PROGRESS NOTE ADULT - PROBLEM SELECTOR PLAN 1
- Pt presents w/ CP; intermittent, independent of exertion, sharp, midsternal, non-radiating; has been occurring intermittently for a few months.   - CP free at this time; HD stable; lying comfortable in no acute distress; physical exam benign.   - Trop (-) x3 and EKGs non-ischemic.   - ESR 50; CRP 0.33; D-dimer < 150.  - Telemetry.   - Monitor vitals.   - Plan for ischemic eval – likely Tuesday 2/16/21.   - ECHO ordered. - Pt presents w/ CP; intermittent, independent of exertion, sharp, midsternal, non-radiating; has been occurring intermittently for a few months.   - CP free at this time; HD stable; lying comfortable in no acute distress; physical exam benign.   - Trop (-) x3 and EKGs non-ischemic.   - ESR 50; CRP 0.33; D-dimer < 150.  - Telemetry.   - Monitor vitals.   - CCTA and ECHO ordered - f/u results (will be done 2/16).

## 2021-02-14 NOTE — BEHAVIORAL HEALTH ASSESSMENT NOTE - HPI (INCLUDE ILLNESS QUALITY, SEVERITY, DURATION, TIMING, CONTEXT, MODIFYING FACTORS, ASSOCIATED SIGNS AND SYMPTOMS)
65 year old , employed and domiciled male with history of outpatient psychiatry treatment 25 years ago for panic and anxiety symptoms reports 1 month of multiple somatic complaints.  Patient complaints include worst HA of his life, chest pain, flushing, SOB, elevated HR, dizziness and feeling like he is going to die (or something is terribly wrong) consistent with panic.  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  Patient was also Dx with Covid and symptomatic 1 month ago, now currently still testing positive but asymptomatic (unless these sxs are residual and/or related to his Covid infection).  Patient reports the symptoms worsen when he closes his eyes and has not slept well X 1 month.  Patient is also Dx with sleep apnea and uses a CPAP at bedtime.  the patient reports trying multiple medication trials for his PCP (apparently at low doses) that have not been effective to date including; Gabapentin, Lexapro, Cymbalta, Trazodone and Seroquel (cardiology also reported xanax).    Patient reports being overly concerned about why he is experiencing these symptoms in an anxious manner and agrees to a generalized type of anxiety chronically.  This writer would recommend the titration of 1 medication prior to discontinuing or adding another medication, for example beginning a trial of Klonopin addressing generalized anxiety as well as somatic complaints at 0.5mg Q12H with titration to 1-2mg BID if needed in order for patient to experience some relief so that he may be more helpful to the primary team in describing his symptoms that may have an underlying medical etiology.  Also, to consider a PT consult for assessment and treatment of L Trapezius tenderness/pain.  Patient denies other mental health issues and stated that he would be "perfect" if he could just lie down and rest/sleep on a routine basis.

## 2021-02-14 NOTE — BEHAVIORAL HEALTH ASSESSMENT NOTE - NSBHCONSULTRECOMMENDOTHER_PSY_A_CORE FT
Discontinue Xanax trial  begin Klonopin (first dose now) 0.5mg Q12H (with titration to end points of anxiety relief)  Increase Gabapentin to 600mg HS for sleep disturbance (titrated to 800mg before deciding treatment failure)  continue Lexapro as prescribed for anxiety and depressive sxs  change Seroquel to PRN refractory anxiety and titrate to 100mg HS PRN  As above, begin Ativan 2mg Q6H PRN breakthrough anxiety or awaiting titration of Klonopin's standing dose

## 2021-02-15 ENCOUNTER — TRANSCRIPTION ENCOUNTER (OUTPATIENT)
Age: 66
End: 2021-02-15

## 2021-02-15 LAB
ALBUMIN SERPL ELPH-MCNC: 4 G/DL — SIGNIFICANT CHANGE UP (ref 3.3–5)
ALP SERPL-CCNC: 64 U/L — SIGNIFICANT CHANGE UP (ref 40–120)
ALT FLD-CCNC: 77 U/L — HIGH (ref 10–45)
ANION GAP SERPL CALC-SCNC: 9 MMOL/L — SIGNIFICANT CHANGE UP (ref 5–17)
AST SERPL-CCNC: 43 U/L — HIGH (ref 10–40)
BILIRUB SERPL-MCNC: 0.4 MG/DL — SIGNIFICANT CHANGE UP (ref 0.2–1.2)
BUN SERPL-MCNC: 14 MG/DL — SIGNIFICANT CHANGE UP (ref 7–23)
CALCIUM SERPL-MCNC: 8.8 MG/DL — SIGNIFICANT CHANGE UP (ref 8.4–10.5)
CHLORIDE SERPL-SCNC: 103 MMOL/L — SIGNIFICANT CHANGE UP (ref 96–108)
CO2 SERPL-SCNC: 28 MMOL/L — SIGNIFICANT CHANGE UP (ref 22–31)
CREAT SERPL-MCNC: 0.93 MG/DL — SIGNIFICANT CHANGE UP (ref 0.5–1.3)
GLUCOSE BLDC GLUCOMTR-MCNC: 117 MG/DL — HIGH (ref 70–99)
GLUCOSE BLDC GLUCOMTR-MCNC: 123 MG/DL — HIGH (ref 70–99)
GLUCOSE BLDC GLUCOMTR-MCNC: 124 MG/DL — HIGH (ref 70–99)
GLUCOSE BLDC GLUCOMTR-MCNC: 128 MG/DL — HIGH (ref 70–99)
GLUCOSE SERPL-MCNC: 142 MG/DL — HIGH (ref 70–99)
HCT VFR BLD CALC: 41.6 % — SIGNIFICANT CHANGE UP (ref 39–50)
HGB BLD-MCNC: 13.4 G/DL — SIGNIFICANT CHANGE UP (ref 13–17)
MAGNESIUM SERPL-MCNC: 1.8 MG/DL — SIGNIFICANT CHANGE UP (ref 1.6–2.6)
MCHC RBC-ENTMCNC: 29.1 PG — SIGNIFICANT CHANGE UP (ref 27–34)
MCHC RBC-ENTMCNC: 32.2 GM/DL — SIGNIFICANT CHANGE UP (ref 32–36)
MCV RBC AUTO: 90.2 FL — SIGNIFICANT CHANGE UP (ref 80–100)
NRBC # BLD: 0 /100 WBCS — SIGNIFICANT CHANGE UP (ref 0–0)
PLATELET # BLD AUTO: 213 K/UL — SIGNIFICANT CHANGE UP (ref 150–400)
POTASSIUM SERPL-MCNC: 4.2 MMOL/L — SIGNIFICANT CHANGE UP (ref 3.5–5.3)
POTASSIUM SERPL-SCNC: 4.2 MMOL/L — SIGNIFICANT CHANGE UP (ref 3.5–5.3)
PROT SERPL-MCNC: 7.5 G/DL — SIGNIFICANT CHANGE UP (ref 6–8.3)
RBC # BLD: 4.61 M/UL — SIGNIFICANT CHANGE UP (ref 4.2–5.8)
RBC # FLD: 13.8 % — SIGNIFICANT CHANGE UP (ref 10.3–14.5)
SODIUM SERPL-SCNC: 140 MMOL/L — SIGNIFICANT CHANGE UP (ref 135–145)
WBC # BLD: 5.05 K/UL — SIGNIFICANT CHANGE UP (ref 3.8–10.5)
WBC # FLD AUTO: 5.05 K/UL — SIGNIFICANT CHANGE UP (ref 3.8–10.5)

## 2021-02-15 PROCEDURE — 99233 SBSQ HOSP IP/OBS HIGH 50: CPT

## 2021-02-15 RX ADMIN — Medication 0.5 MILLIGRAM(S): at 06:35

## 2021-02-15 RX ADMIN — ENOXAPARIN SODIUM 40 MILLIGRAM(S): 100 INJECTION SUBCUTANEOUS at 22:14

## 2021-02-15 RX ADMIN — Medication 0: at 12:49

## 2021-02-15 RX ADMIN — PANTOPRAZOLE SODIUM 40 MILLIGRAM(S): 20 TABLET, DELAYED RELEASE ORAL at 06:35

## 2021-02-15 RX ADMIN — ESCITALOPRAM OXALATE 10 MILLIGRAM(S): 10 TABLET, FILM COATED ORAL at 12:49

## 2021-02-15 RX ADMIN — ATORVASTATIN CALCIUM 40 MILLIGRAM(S): 80 TABLET, FILM COATED ORAL at 22:14

## 2021-02-15 RX ADMIN — ENOXAPARIN SODIUM 40 MILLIGRAM(S): 100 INJECTION SUBCUTANEOUS at 10:01

## 2021-02-15 RX ADMIN — Medication 0.5 MILLIGRAM(S): at 17:55

## 2021-02-15 RX ADMIN — AMLODIPINE BESYLATE 5 MILLIGRAM(S): 2.5 TABLET ORAL at 06:35

## 2021-02-15 RX ADMIN — GABAPENTIN 600 MILLIGRAM(S): 400 CAPSULE ORAL at 22:28

## 2021-02-15 RX ADMIN — Medication 650 MILLIGRAM(S): at 22:14

## 2021-02-15 RX ADMIN — Medication 81 MILLIGRAM(S): at 12:49

## 2021-02-15 NOTE — PROGRESS NOTE BEHAVIORAL HEALTH - NSBHADMITCOUNSEL_PSY_A_CORE
diagnostic results/impressions and/or recommended studies/risks and benefits of treatment options/instructions for management, treatment and follow up/importance of adherence to chosen treatment/risk factor reduction/client/family/caregiver education/prognosis/other...

## 2021-02-15 NOTE — DISCHARGE NOTE PROVIDER - PROVIDER TOKENS
PROVIDER:[TOKEN:[[id number]]],FREE:[LAST:[[facility]],PHONE:[phone]],FAX:[(   )    -],ADDRESS:[[address] [place] [address]]] PROVIDER:[TOKEN:[[id number]]],FREE:[LAST:[[facility]],PHONE:[phone]],FAX:[(   )    -],ADDRESS:[[address] [place] [address]]],PROVIDER:[TOKEN:[[id number]]] PROVIDER:[TOKEN:[[id number]]],FREE:[LAST:[[facility]],PHONE:[phone]],FAX:[(   )    -],ADDRESS:[[address] [place] [address]]],PROVIDER:[TOKEN:[[id number]]]

## 2021-02-15 NOTE — DISCHARGE NOTE PROVIDER - NSDCFUADDAPPT_GEN_ALL_CORE_FT
(1) Please follow up and establish care with cardiologist, Dr. Smallwood.   (2) Please make an appointment with the [facility]. Please make this appointment as soon as possible, so that you can receive your prescription for Klonopin.  (1) Please follow up and establish care with cardiologist, Dr. Smallwood.   (2) Please make an appointment with the [facility]. Please make this appointment as soon as possible, so that you can receive your prescription for Klonopin. You are welcome to show up at their walk-in clinic at 7:30am tomorrow morning (2/17/21) to be seen. **BE SURE TO BRING YOUR DISCHARGE PAPERWORK**  (3) Please make an appointment to follow up with your primary care doctor, Dr. Nathan.  (1) Please follow up and establish care with cardiologist, Dr. Skinner  (2) Please make an appointment with the [facility]. *BE SURE TO BRING YOUR DISCHARGE PAPERWORK** They have a walk in clinic.   (3) Please make an appointment to follow up with your primary care doctor, Dr. Nathan.

## 2021-02-15 NOTE — PROGRESS NOTE BEHAVIORAL HEALTH - NSBHCONSULTFOLLOWAFTERCARE_PSY_A_CORE FT
gave pt info on outpatient psychiatyric clinics. He can follow up at [facility] at 8am on day after discharge, [facility], [place] [id number] [phone]

## 2021-02-15 NOTE — DISCHARGE NOTE PROVIDER - HOSPITAL COURSE
65 year old male with history of HTN, DM2, HLD, MICHELLE on CPAP, recent COVID + in Jan 2021 presents to ED with concern for persistent chest pain and intermittent shortness of breath with inability to sleep and feeling anxious.  Patient notes he followed up with his PCP and was told his symptoms were due to anxiety, however patient does not feel improved and requests further evaluation. Patient had CCTA ______ and ECHO _________. While admitted, troponin levels negative x3, EKGs non-ischemic, no events on telemetry, vital signs remained stable. Patient was admitted to 2WO COVID unit, as he continues testing positive, even though patient's infection was > 3 weeks ago. Inflammatory markers were unremarkable. Other active issues this admission includes severe anxiety w/ panic attacks, for which psych was consulted. Psych recs: discontinue Xanax, discontinue Seroquel, increased Gabapentin to 600mg PO QHS, and started Klonopin 0.5mg PO BID. Patient had good response to new regimen.     On day of exam, patient seen and evaluated at bedside. VSS, telemetry unremarkable, physical exam benign. Patient hospital course reviewed with cardiology attending and patient deemed stable for discharge home. Patient will establish care w/ cardiologist, Dr. Smallwood. Patient will follow up with [facility] for prescriptions for his psych medications and follow up.      65 year old male with history of HTN, DM2, HLD, MICHELLE on CPAP, recent COVID + in Jan 2021 presents to ED with concern for persistent chest pain and intermittent shortness of breath with inability to sleep and feeling anxious.  Patient notes he followed up with his PCP and was told his symptoms were due to anxiety, however patient does not feel improved and requests further evaluation. Patient had CCTA ___________. While admitted, troponin levels negative x3, EKGs non-ischemic, no events on telemetry, vital signs remained stable. Patient was admitted to 2WO COVID unit, as he continues testing positive, even though patient's infection was > 3 weeks ago. Inflammatory markers were unremarkable. Other active issues this admission includes severe anxiety w/ panic attacks, for which psych was consulted. Psych recs: discontinue Xanax, discontinue Seroquel, increased Gabapentin to 600mg PO QHS, and started Klonopin 0.5mg PO BID. Patient had good response to new regimen. Klonopin prescription has    On day of exam, patient seen and evaluated at bedside. VSS, telemetry unremarkable, physical exam benign. Patient hospital course reviewed with cardiology attending and patient deemed stable for discharge home. Patient will establish care w/ cardiologist, Dr. Smallwood. Patient will follow up with [facility] for prescriptions for his psych medications and follow up.      65 year old male with PMHx  HTN, DM2, HLD, MICHELLE on CPAP, recent COVID + in Jan 2021 presents to ED with concern for persistent chest pain and intermittent shortness of breath with inability to sleep and feeling anxious.  Patient notes he followed up with his PCP and was told his symptoms were due to anxiety, however patient does not feel improved and requests further evaluation. While admitted, troponin levels negative x 3, EKGs non-ischemic, no events on telemetry, vital signs remained stable.   CTA coronaries 2/16/2021 revealed LVEF 56 %, The calcium score is 22 Agatston units, which is at the 39 percentile, adjusted for age, gender and race.Minimal LAD disease otherwise normal coronary arteries.  Patient was admitted to 2WO COVID unit, as he continues testing positive, even though patient's infection was > 3 weeks ago. Inflammatory markers were unremarkable. Other active issues this admission includes severe anxiety w/ panic attacks, for which psych was consulted. Psych recs: discontinue Xanax, discontinue Seroquel, increased Gabapentin to 600mg PO QHS, and started Klonopin 0.5mg PO BID. Patient had good response to new regimen. Klonopin prescription has been prescribed by patients PCP to preferred Pharmacy.   On day of exam, patient seen and evaluated at bedside. VSS, telemetry unremarkable, physical exam benign. Patient C/P free and HD stable and cleared for discharge by Dr. Gilbert. Patient will establish care w/ cardiologist, Dr. Skinner Patient will follow up with [facility] for prescriptions for his psych medications and follow up.

## 2021-02-15 NOTE — DISCHARGE NOTE PROVIDER - CARE PROVIDER_API CALL
Nii Smallwood  CARDIOVASCULAR DISEASE  [address] [place] [address], [address]  [place] [address]  Phone: [phone]  Fax: [phone]  Follow Up Time:     [facility],   [address] [place] [address]  Phone: [phone]  Fax: (   )    -  Follow Up Time:    Nii Smallwood  CARDIOVASCULAR DISEASE  [address] [place] [address], [address]  [place] [address]  Phone: [phone]  Fax: [phone]  Follow Up Time:     [facility],   [address] [place] [address]  Phone: [phone]  Fax: (   )    -  Follow Up Time:     Alejandro Nathan)  Internal Medicine  [address] [place] [address]  Phone: [phone]  Fax: [phone]  Follow Up Time:    Alejandro Nathan)  Internal Medicine  [address] [place] [address]  Phone: [phone]  Fax: [phone]  Follow Up Time:     [facility],   [address] [place] [address]  Phone: [phone]  Fax: (   )    -  Follow Up Time:     Leeroy Skinner)  Cardiovascular Disease; Internal Medicine  Cardiology [facility], [address] [place] [address]  Phone: [phone]  Fax: [phone]  Follow Up Time:

## 2021-02-15 NOTE — PROGRESS NOTE ADULT - ASSESSMENT
65 year old male with history of HTN, DM2, HLD, MICHELLE on CPAP, recent COVID + in Jan 2021 presents to ED with concern for persistent chest pain and intermittent shortness of breath with inability to sleep and feeling anxious.  Patient notes he followed up with his PCP and was told his symptoms were due to anxiety, however patient does not feel improved and requests further evaluation.  He also complains of persistent headache and body aches.  Patient denies associated fever, chills, shortness of breath or chest pain currently, abdominal pain, nausea, emesis, changes to bowel movements, calf pain, lower extremity swelling, rashes, recent travel or any additional acute complaints or concerns at this time.   65 year old male with history of HTN, DM2, HLD, MICHELLE on CPAP, recent COVID + in Jan 2021 presents to ED with concern for persistent chest pain and intermittent shortness of breath with inability to sleep and feeling anxious.  Patient notes he followed up with his PCP and was told his symptoms were due to anxiety, however patient does not feel improved and requests further evaluation. For CP evaluation, patient pending CCTA and ECHO 2/16/21. Psych consulted for evaluation/management of anxiety/panic attacks/somatization.

## 2021-02-15 NOTE — PROGRESS NOTE ADULT - PROBLEM SELECTOR PLAN 7
- On CPAP at night – continue.      DVT ppx: Lovenox SQ  Dispo: ischemic eval tuesday; monitor/work up.

## 2021-02-15 NOTE — PROGRESS NOTE BEHAVIORAL HEALTH - NSBHCONSULTRECOMMENDOTHER_PSY_A_CORE FT
Discontinue Xanax trial  begin Klonopin (first dose now) 0.5mg Q12H (with titration to end points of anxiety relief)  Gabapentin 600mg HS for sleep disturbance   continue Lexapro 10 mg daily as prescribed for anxiety and depressive sxs  change Seroquel to PRN refractory anxiety and titrate to 100mg HS PRN

## 2021-02-15 NOTE — PROGRESS NOTE ADULT - PROBLEM SELECTOR PLAN 3
- Pt very anxious.   - Reports he saw his PCP for his concerns, and his PCP said it was anxiety.   - Pt reporting panic attacks.   - PSYCH consulted - Klonopin 0.5mg PO q12hrs, Gabapentin 600mg PO QHS  - May use Ativan 2mg q6hrs PRN for breakthrough.   - F/U w/ psych recommendations for follow up.   - Continue following psych recs.

## 2021-02-15 NOTE — PROGRESS NOTE BEHAVIORAL HEALTH - NSBHADMITCOUNSELOTHER_PSY_A_CORE FT
Discussed the ability of anxiety to cause somatic symptoms, the state of outpatient psychiatry in [facility] at this time, and his insomnia. Discussed his family support.

## 2021-02-15 NOTE — PROGRESS NOTE ADULT - SUBJECTIVE AND OBJECTIVE BOX
*** IN PROGRESS / INCOMPLETE NOTE ***      Interventional Cardiology PA Adult Progress Note    Subjective Assessment:  	  MEDICATIONS:  amLODIPine   Tablet 5 milliGRAM(s) Oral daily  acetaminophen   Tablet .. 650 milliGRAM(s) Oral every 6 hours PRN  clonazePAM  Tablet 0.5 milliGRAM(s) Oral every 12 hours  escitalopram 10 milliGRAM(s) Oral daily  gabapentin 600 milliGRAM(s) Oral at bedtime  pantoprazole    Tablet 40 milliGRAM(s) Oral before breakfast  atorvastatin 40 milliGRAM(s) Oral at bedtime  dextrose 40% Gel 15 Gram(s) Oral once  dextrose 50% Injectable 25 Gram(s) IV Push once  dextrose 50% Injectable 12.5 Gram(s) IV Push once  dextrose 50% Injectable 25 Gram(s) IV Push once  glucagon  Injectable 1 milliGRAM(s) IntraMuscular once  insulin lispro (ADMELOG) corrective regimen sliding scale   SubCutaneous Before meals and at bedtime  artificial  tears Solution 1 Drop(s) Both EYES every 6 hours PRN  aspirin enteric coated 81 milliGRAM(s) Oral daily  dextrose 5%. 1000 milliLiter(s) IV Continuous <Continuous>  dextrose 5%. 1000 milliLiter(s) IV Continuous <Continuous>  enoxaparin Injectable 40 milliGRAM(s) SubCutaneous every 12 hours      	    [PHYSICAL EXAM:  TELEMETRY:  T(C): 36.6 (02-15-21 @ 09:50), Max: 37.9 (02-14-21 @ 12:10)  HR: 76 (02-15-21 @ 09:33) (54 - 91)  BP: 120/65 (02-15-21 @ 09:33) (95/64 - 123/72)  RR: 18 (02-15-21 @ 09:33) (16 - 23)  SpO2: 97% (02-15-21 @ 09:33) (96% - 99%)  Wt(kg): --  I&O's Summary    14 Feb 2021 07:01  -  15 Feb 2021 07:00  --------------------------------------------------------  IN: 840 mL / OUT: 1850 mL / NET: -1010 mL          LABS:	 	  CARDIAC MARKERS:                          13.4   5.05  )-----------( 213      ( 15 Feb 2021 07:24 )             41.6     140  |  103  |  14  ----------------------------<  142<H>  4.2   |  28  |  0.93    Ca    8.8      15 Feb 2021 07:24  Mg     1.8     02-15    TPro  7.5  /  Alb  4.0  /  TBili  0.4  /  DBili  x   /  AST  43<H>  /  ALT  77<H>  /  AlkPhos  64  02-15    PT/INR - ( 13 Feb 2021 12:25 )   PT: 12.9 sec;   INR: 1.08        PTT - ( 13 Feb 2021 12:25 )  PTT:31.6 sec       Interventional Cardiology PA Adult Progress Note    Subjective Assessment: Pt seen and evaluated at bedside. Pt reports the anxiety medications were very helpful and he was able to sleep. Patient lying comfortably. Still endorsing vague diffuse symptoms like HA, neck pain, intermittent chest pain.     Objective Assessment: Pt comfortable in no acute distress. Vitals stable. No events on telemetry.   	  MEDICATIONS:  amLODIPine   Tablet 5 milliGRAM(s) Oral daily  acetaminophen   Tablet .. 650 milliGRAM(s) Oral every 6 hours PRN  clonazePAM  Tablet 0.5 milliGRAM(s) Oral every 12 hours  escitalopram 10 milliGRAM(s) Oral daily  gabapentin 600 milliGRAM(s) Oral at bedtime  pantoprazole    Tablet 40 milliGRAM(s) Oral before breakfast  atorvastatin 40 milliGRAM(s) Oral at bedtime  dextrose 40% Gel 15 Gram(s) Oral once  dextrose 50% Injectable 25 Gram(s) IV Push once  dextrose 50% Injectable 12.5 Gram(s) IV Push once  dextrose 50% Injectable 25 Gram(s) IV Push once  glucagon  Injectable 1 milliGRAM(s) IntraMuscular once  insulin lispro (ADMELOG) corrective regimen sliding scale   SubCutaneous Before meals and at bedtime  artificial  tears Solution 1 Drop(s) Both EYES every 6 hours PRN  aspirin enteric coated 81 milliGRAM(s) Oral daily  dextrose 5%. 1000 milliLiter(s) IV Continuous <Continuous>  dextrose 5%. 1000 milliLiter(s) IV Continuous <Continuous>  enoxaparin Injectable 40 milliGRAM(s) SubCutaneous every 12 hours      	  PHYSICAL EXAM:  TELEMETRY: no events on telemetry  T(C): 36.6 (02-15-21 @ 09:50), Max: 37.9 (02-14-21 @ 12:10)  HR: 76 (02-15-21 @ 09:33) (54 - 91)  BP: 120/65 (02-15-21 @ 09:33) (95/64 - 123/72)  RR: 18 (02-15-21 @ 09:33) (16 - 23)  SpO2: 97% (02-15-21 @ 09:33) (96% - 99%)  Wt(kg): --  I&O's Summary    14 Feb 2021 07:01  -  15 Feb 2021 07:00  --------------------------------------------------------  IN: 840 mL / OUT: 1850 mL / NET: -1010 mL    Appearance: Normal	  HEENT:   Normal oral mucosa, PERRL, EOMI	  Neck: Supple, - JVD; Carotid Bruit   Cardiovascular: Normal S1 S2, No JVD, No murmurs,   Respiratory: Lungs clear to auscultation  Gastrointestinal:  Soft, Non-tender, + BS	  Skin: No rashes, No ecchymoses, No cyanosis  Extremities: Normal range of motion, No clubbing, cyanosis or edema  Vascular: Peripheral pulses palpable 2+ bilaterally  Neurologic: Non-focal  Psychiatry: A & O x 3, Mood & affect appropriate      LABS:	 	  CARDIAC MARKERS:                          13.4   5.05  )-----------( 213      ( 15 Feb 2021 07:24 )             41.6     140  |  103  |  14  ----------------------------<  142<H>  4.2   |  28  |  0.93    Ca    8.8      15 Feb 2021 07:24  Mg     1.8     02-15    TPro  7.5  /  Alb  4.0  /  TBili  0.4  /  DBili  x   /  AST  43<H>  /  ALT  77<H>  /  AlkPhos  64  02-15    PT/INR - ( 13 Feb 2021 12:25 )   PT: 12.9 sec;   INR: 1.08        PTT - ( 13 Feb 2021 12:25 )  PTT:31.6 sec

## 2021-02-15 NOTE — DISCHARGE NOTE PROVIDER - NSDCCPCAREPLAN_GEN_ALL_CORE_FT
PRINCIPAL DISCHARGE DIAGNOSIS  Diagnosis: Chest pain  Assessment and Plan of Treatment: You presented to the hospital with a concern for chest pain. For this, you have received a cardiac workup that was negative. Your vitals remained stable, no abnormalities were noted for your heart rhythm, your blood work was normal, and your cardiac imaging studies were normal. We want you to follow up with cardiologist, DR. COMER, within 2 weeks to establish care with a cardiologist.      SECONDARY DISCHARGE DIAGNOSES  Diagnosis: Generalized anxiety disorder with panic attacks  Assessment and Plan of Treatment: You were noted to have very significant anxiety during this admission, for which we     PRINCIPAL DISCHARGE DIAGNOSIS  Diagnosis: Chest pain  Assessment and Plan of Treatment: You presented to the hospital with a concern for chest pain. For this, you have received a cardiac workup that was negative. Your vitals remained stable, no abnormalities were noted for your heart rhythm, your blood work was normal, and your cardiac imaging studies were normal. We want you to follow up with cardiologist, DR. COMER, within 2 weeks to establish care with a cardiologist.      SECONDARY DISCHARGE DIAGNOSES  Diagnosis: Generalized anxiety disorder with panic attacks  Assessment and Plan of Treatment: You were noted to have very significant anxiety during this admission, for which we consulted our psychiatry team. They have increased your home Gabapenting. Please START taking Gabapentin 600mg daily at bedtime. Please STOP taking your home Alprazolam (Xanax). Please STOP taking Quetiapine (Seroquel). They have also started you on a medication called Clonazepam (Klonipin). Please START taking Klonipin 0.5mg twice daily (once in the morning, and once at night). ***WE ARE NOT ABLE TO SEND A PRESCRIPTION FOR THIS MEDICATION*** You need to follow up w/ the psychiatric clinic at [facility] to obtain a prescription for this medication. The information for this follow up has been provided, and it is important that you reach out to them as soon as possible.     PRINCIPAL DISCHARGE DIAGNOSIS  Diagnosis: Chest pain  Assessment and Plan of Treatment: You presented to the hospital with a concern for chest pain. For this, you have received a cardiac workup that was negative. Your vitals remained stable, no abnormalities were noted for your heart rhythm, your blood work was normal, and your cardiac imaging studies were normal. We want you to follow up with cardiologist, DR. COMER, within 2 weeks to establish care with a cardiologist.      SECONDARY DISCHARGE DIAGNOSES  Diagnosis: Generalized anxiety disorder with panic attacks  Assessment and Plan of Treatment: You were noted to have very significant anxiety during this admission, for which we consulted our psychiatry team. They have increased your home Gabapenting. Please START taking Gabapentin 600mg daily at bedtime. Please STOP taking your home Alprazolam (Xanax). Please STOP taking Quetiapine (Seroquel). They have also started you on a medication called Clonazepam (Klonipin). Please START taking Klonopin 0.5mg twice daily (once in the morning, and once at night). ***WE ARE NOT ABLE TO SEND A PRESCRIPTION FOR KLONOPIN; A prescription has been sent by your primary care provider, Dr. Nathan. For more prescriptions you will need to follow up with Dr. Nathan or and outpatient psychiatry provider. *** You need to follow up w/ the psychiatric clinic at [facility] to obtain a prescription for this medication. The information for this follow up has been provided, and it is important that you reach out to them as soon as possible.    Diagnosis: Hyperlipidemia  Assessment and Plan of Treatment: Please continue Crestor 10mg to keep your cholesterol low. High cholesterol contributes to heart disease.    Diagnosis: Hypertension  Assessment and Plan of Treatment: Please continue medications as listed to keep your blood pressure controlled. For blood pressure that is too high or too low please see your doctor or go to the emergency room as necessary.    Diagnosis: MICHELLE (obstructive sleep apnea)  Assessment and Plan of Treatment: Please continue using your CPAP machine at home.    Diagnosis: DM (diabetes mellitus)  Assessment and Plan of Treatment: Please continue medications as listed for diabetes. Maintain a low carbohydrate, low sugar diet. Check for your blood sugars regularly.     PRINCIPAL DISCHARGE DIAGNOSIS  Diagnosis: Chest pain  Assessment and Plan of Treatment: You presented to the hospital with a concern for chest pain. For this, you have received a cardiac workup that was negative. Your vitals remained stable, no abnormalities were noted for your heart rhythm, your blood work was normal, and your cardiac imaging studies were normal. We want you to follow up with cardiologist, DR. Skinner within 2 weeks to establish care with a cardiologist.      SECONDARY DISCHARGE DIAGNOSES  Diagnosis: Hypertension  Assessment and Plan of Treatment: Please continue medications as listed to keep your blood pressure controlled. For blood pressure that is too high or too low please see your doctor or go to the emergency room as necessary.    Diagnosis: Hyperlipidemia  Assessment and Plan of Treatment: Please continue Crestor 10mg to keep your cholesterol low. High cholesterol contributes to heart disease.    Diagnosis: MICHELLE (obstructive sleep apnea)  Assessment and Plan of Treatment: Please continue using your CPAP machine at home.    Diagnosis: DM (diabetes mellitus)  Assessment and Plan of Treatment: Please continue medications as listed for diabetes. Maintain a low carbohydrate, low sugar diet. Check for your blood sugars regularly.    Diagnosis: Generalized anxiety disorder with panic attacks  Assessment and Plan of Treatment: You were noted to have very significant anxiety during this admission, for which we consulted our psychiatry team. They have increased your home Gabapentin. Please START taking Gabapentin 600mg daily at bedtime. Please STOP taking your home Alprazolam (Xanax). Please STOP taking Quetiapine (Seroquel). They have also started you on a medication called Clonazepam (Klonipin). Please START taking Klonopin 0.5mg twice daily (once in the morning, and once at night). ***WE ARE NOT ABLE TO SEND A PRESCRIPTION FOR KLONOPIN; A prescription has been sent by your primary care provider, Dr. Nathan. For more prescriptions you will need to follow up with Dr. Nathan or and outpatient psychiatry provider. *** You need to follow up w/ the psychiatric clinic at [facility] to obtain a prescription for this medication. The information for this follow up has been provided, and it is important that you reach out to them as soon as possible.     PRINCIPAL DISCHARGE DIAGNOSIS  Diagnosis: Chest pain  Assessment and Plan of Treatment: You presented to the hospital with a concern for chest pain. For this, you have received a cardiac workup that was negative. Your vitals remained stable, no abnormalities were noted for your heart rhythm, your blood work was normal, and your cardiac imaging studies revealed mild blockage in one of your arteries that does not require intervention. We want you to follow up with cardiologist, DR. Skinner within 2 weeks to establish care with a cardiologist. You were started on a Baby ASA here and should continue on discharge.      SECONDARY DISCHARGE DIAGNOSES  Diagnosis: Hypertension  Assessment and Plan of Treatment: Please continue medications as listed to keep your blood pressure controlled. For blood pressure that is too high or too low please see your doctor or go to the emergency room as necessary.    Diagnosis: Hyperlipidemia  Assessment and Plan of Treatment: Please continue Crestor 10mg to keep your cholesterol low. High cholesterol contributes to heart disease.    Diagnosis: MICHELLE (obstructive sleep apnea)  Assessment and Plan of Treatment: Please continue using your CPAP machine at home.    Diagnosis: DM (diabetes mellitus)  Assessment and Plan of Treatment: Please continue medications as listed for diabetes. Maintain a low carbohydrate, low sugar diet. Check for your blood sugars regularly.    Diagnosis: Generalized anxiety disorder with panic attacks  Assessment and Plan of Treatment: You were noted to have very significant anxiety during this admission, for which we consulted our psychiatry team. They have increased your home Gabapentin. Please START taking Gabapentin 600mg daily at bedtime. Please STOP taking your home Alprazolam (Xanax). Please STOP taking Quetiapine (Seroquel). They have also started you on a medication called Clonazepam (Klonipin). Please START taking Klonopin 0.5mg twice daily (once in the morning, and once at night). ***WE ARE NOT ABLE TO SEND A PRESCRIPTION FOR KLONOPIN; A prescription has been sent by your primary care provider, Dr. Nathan. For more prescriptions you will need to follow up with Dr. Nathan or and outpatient psychiatry provider. *** You need to follow up w/ the psychiatric clinic at [facility] to obtain a prescription for this medication. The information for this follow up has been provided, and it is important that you reach out to them as soon as possible.     PRINCIPAL DISCHARGE DIAGNOSIS  Diagnosis: Chest pain  Assessment and Plan of Treatment: You presented to the hospital with a concern for chest pain. For this, you have received a cardiac workup that was negative. Your vitals remained stable, no abnormalities were noted for your heart rhythm, your blood work was normal, and your cardiac imaging studies revealed mild blockage in one of your arteries that does not require intervention. We want you to follow up with cardiologist, DR. Skinner within 2 weeks to establish care with a cardiologist. You were started on a Baby ASA here and should continue on discharge.      SECONDARY DISCHARGE DIAGNOSES  Diagnosis: Hypertension  Assessment and Plan of Treatment: Please continue medications as listed to keep your blood pressure controlled. For blood pressure that is too high or too low please see your doctor or go to the emergency room as necessary.    Diagnosis: Hyperlipidemia  Assessment and Plan of Treatment: Please continue Crestor 10mg to keep your cholesterol low. High cholesterol contributes to heart disease.    Diagnosis: MICHELLE (obstructive sleep apnea)  Assessment and Plan of Treatment: Please continue using your CPAP machine at home.    Diagnosis: DM (diabetes mellitus)  Assessment and Plan of Treatment: Please continue medications as listed for diabetes. Maintain a low carbohydrate, low sugar diet. Check for your blood sugars regularly.    Diagnosis: Generalized anxiety disorder with panic attacks  Assessment and Plan of Treatment: You were noted to have very significant anxiety during this admission, for which we consulted our psychiatry team. They have increased your home Gabapentin. Please START taking Gabapentin 600mg daily at bedtime. Please STOP taking your home Alprazolam (Xanax). Please STOP taking Quetiapine (Seroquel). They have also started you on a medication called Clonazepam (Klonipin). Continue Levapro. Please START taking Klonopin 0.5mg twice daily (once in the morning, and once at night). ***WE ARE NOT ABLE TO SEND A PRESCRIPTION FOR KLONOPIN; A prescription has been sent by your primary care provider, Dr. Nathan. For more prescriptions you will need to follow up with Dr. Nathan or and outpatient psychiatry provider. *** You need to follow up w/ the psychiatric clinic at [facility] [place] to obtain a prescription for this medication. The information for this follow up has been provided, and it is important that you reach out to them as soon as possible.

## 2021-02-15 NOTE — DISCHARGE NOTE PROVIDER - NSDCMRMEDTOKEN_GEN_ALL_CORE_FT
ALPRAZolam 0.25 mg oral tablet: 1 tab(s) orally once a day, As Needed  amLODIPine 5 mg oral tablet: 1 tab(s) orally once a day  Crestor 10 mg oral tablet: 1 tab(s) orally once a day  gabapentin 300 mg oral tablet: 1 tab(s) orally once a day (at bedtime)  Lexapro 10 mg oral tablet: 1 tab(s) orally once a day  metFORMIN 1000 mg oral tablet: 1 tab(s) orally 2 times a day  omeprazole 40 mg oral delayed release capsule: 1 cap(s) orally once a day  QUEtiapine 25 mg oral tablet: 1 tab(s) orally once a day (at bedtime)   acetaminophen 325 mg oral tablet: 2 tab(s) orally every 6 hours, As needed, Moderate Pain (4 - 6)  amLODIPine 5 mg oral tablet: 1 tab(s) orally once a day  aspirin 81 mg oral delayed release tablet: 1 tab(s) orally once a day  clonazePAM 0.5 mg oral tablet: 1 tab(s) orally every 12 hours  Crestor 10 mg oral tablet: 1 tab(s) orally once a day  Lexapro 10 mg oral tablet: 1 tab(s) orally once a day  metFORMIN 1000 mg oral tablet: 1 tab(s) orally 2 times a day  Neurontin 600 mg oral tablet: 1 tab(s) orally once a day (at bedtime)  omeprazole 40 mg oral delayed release capsule: 1 cap(s) orally once a day

## 2021-02-15 NOTE — DISCHARGE NOTE PROVIDER - INSTRUCTIONS
A Mediterranean Diet is recommended! Some suggestions include continue incorporating 2 or more servings per day of vegetables, fruits, and whole grains. Increase intake of fish and legumes/beans to 2 or more servings per week. Aim to increase intake of healthy fats, such as olive oil and avocados, and have a handful of nuts/seeds most days. Reduce red/processed meat consumption to 2 or fewer times per week

## 2021-02-16 LAB
ALBUMIN SERPL ELPH-MCNC: 3.9 G/DL — SIGNIFICANT CHANGE UP (ref 3.3–5)
ALP SERPL-CCNC: 62 U/L — SIGNIFICANT CHANGE UP (ref 40–120)
ALT FLD-CCNC: 66 U/L — HIGH (ref 10–45)
ANION GAP SERPL CALC-SCNC: 11 MMOL/L — SIGNIFICANT CHANGE UP (ref 5–17)
AST SERPL-CCNC: 31 U/L — SIGNIFICANT CHANGE UP (ref 10–40)
BILIRUB SERPL-MCNC: 0.4 MG/DL — SIGNIFICANT CHANGE UP (ref 0.2–1.2)
BUN SERPL-MCNC: 17 MG/DL — SIGNIFICANT CHANGE UP (ref 7–23)
CALCIUM SERPL-MCNC: 8.8 MG/DL — SIGNIFICANT CHANGE UP (ref 8.4–10.5)
CHLORIDE SERPL-SCNC: 103 MMOL/L — SIGNIFICANT CHANGE UP (ref 96–108)
CO2 SERPL-SCNC: 26 MMOL/L — SIGNIFICANT CHANGE UP (ref 22–31)
CREAT SERPL-MCNC: 0.9 MG/DL — SIGNIFICANT CHANGE UP (ref 0.5–1.3)
GLUCOSE BLDC GLUCOMTR-MCNC: 124 MG/DL — HIGH (ref 70–99)
GLUCOSE BLDC GLUCOMTR-MCNC: 129 MG/DL — HIGH (ref 70–99)
GLUCOSE BLDC GLUCOMTR-MCNC: 131 MG/DL — HIGH (ref 70–99)
GLUCOSE BLDC GLUCOMTR-MCNC: 191 MG/DL — HIGH (ref 70–99)
GLUCOSE BLDC GLUCOMTR-MCNC: 388 MG/DL — HIGH (ref 70–99)
GLUCOSE SERPL-MCNC: 132 MG/DL — HIGH (ref 70–99)
HCT VFR BLD CALC: 40.9 % — SIGNIFICANT CHANGE UP (ref 39–50)
HGB BLD-MCNC: 13.3 G/DL — SIGNIFICANT CHANGE UP (ref 13–17)
MAGNESIUM SERPL-MCNC: 1.9 MG/DL — SIGNIFICANT CHANGE UP (ref 1.6–2.6)
MCHC RBC-ENTMCNC: 29.4 PG — SIGNIFICANT CHANGE UP (ref 27–34)
MCHC RBC-ENTMCNC: 32.5 GM/DL — SIGNIFICANT CHANGE UP (ref 32–36)
MCV RBC AUTO: 90.3 FL — SIGNIFICANT CHANGE UP (ref 80–100)
NRBC # BLD: 0 /100 WBCS — SIGNIFICANT CHANGE UP (ref 0–0)
PLATELET # BLD AUTO: 218 K/UL — SIGNIFICANT CHANGE UP (ref 150–400)
POTASSIUM SERPL-MCNC: 4.2 MMOL/L — SIGNIFICANT CHANGE UP (ref 3.5–5.3)
POTASSIUM SERPL-SCNC: 4.2 MMOL/L — SIGNIFICANT CHANGE UP (ref 3.5–5.3)
PROT SERPL-MCNC: 7 G/DL — SIGNIFICANT CHANGE UP (ref 6–8.3)
RBC # BLD: 4.53 M/UL — SIGNIFICANT CHANGE UP (ref 4.2–5.8)
RBC # FLD: 13.6 % — SIGNIFICANT CHANGE UP (ref 10.3–14.5)
SODIUM SERPL-SCNC: 140 MMOL/L — SIGNIFICANT CHANGE UP (ref 135–145)
WBC # BLD: 5.84 K/UL — SIGNIFICANT CHANGE UP (ref 3.8–10.5)
WBC # FLD AUTO: 5.84 K/UL — SIGNIFICANT CHANGE UP (ref 3.8–10.5)

## 2021-02-16 PROCEDURE — 99233 SBSQ HOSP IP/OBS HIGH 50: CPT

## 2021-02-16 PROCEDURE — 75574 CT ANGIO HRT W/3D IMAGE: CPT | Mod: 26

## 2021-02-16 RX ORDER — ACETAMINOPHEN 500 MG
2 TABLET ORAL
Qty: 0 | Refills: 0 | DISCHARGE
Start: 2021-02-16

## 2021-02-16 RX ORDER — GABAPENTIN 400 MG/1
1 CAPSULE ORAL
Qty: 30 | Refills: 0
Start: 2021-02-16 | End: 2021-03-17

## 2021-02-16 RX ORDER — QUETIAPINE FUMARATE 200 MG/1
1 TABLET, FILM COATED ORAL
Qty: 0 | Refills: 0 | DISCHARGE

## 2021-02-16 RX ORDER — CLONAZEPAM 1 MG
1 TABLET ORAL
Qty: 0 | Refills: 0 | DISCHARGE
Start: 2021-02-16

## 2021-02-16 RX ORDER — ACETAMINOPHEN 500 MG
1000 TABLET ORAL ONCE
Refills: 0 | Status: COMPLETED | OUTPATIENT
Start: 2021-02-16 | End: 2021-02-16

## 2021-02-16 RX ORDER — ASPIRIN/CALCIUM CARB/MAGNESIUM 324 MG
1 TABLET ORAL
Qty: 0 | Refills: 0 | DISCHARGE
Start: 2021-02-16

## 2021-02-16 RX ORDER — GABAPENTIN 400 MG/1
1 CAPSULE ORAL
Qty: 0 | Refills: 0 | DISCHARGE

## 2021-02-16 RX ORDER — ALPRAZOLAM 0.25 MG
1 TABLET ORAL
Qty: 0 | Refills: 0 | DISCHARGE

## 2021-02-16 RX ADMIN — ATORVASTATIN CALCIUM 40 MILLIGRAM(S): 80 TABLET, FILM COATED ORAL at 21:01

## 2021-02-16 RX ADMIN — Medication 0.5 MILLIGRAM(S): at 17:46

## 2021-02-16 RX ADMIN — GABAPENTIN 600 MILLIGRAM(S): 400 CAPSULE ORAL at 21:01

## 2021-02-16 RX ADMIN — Medication 0: at 14:31

## 2021-02-16 RX ADMIN — AMLODIPINE BESYLATE 5 MILLIGRAM(S): 2.5 TABLET ORAL at 05:30

## 2021-02-16 RX ADMIN — Medication 1: at 21:45

## 2021-02-16 RX ADMIN — Medication 400 MILLIGRAM(S): at 12:51

## 2021-02-16 RX ADMIN — PANTOPRAZOLE SODIUM 40 MILLIGRAM(S): 20 TABLET, DELAYED RELEASE ORAL at 05:30

## 2021-02-16 RX ADMIN — Medication 0.5 MILLIGRAM(S): at 05:30

## 2021-02-16 RX ADMIN — ENOXAPARIN SODIUM 40 MILLIGRAM(S): 100 INJECTION SUBCUTANEOUS at 21:01

## 2021-02-16 RX ADMIN — ENOXAPARIN SODIUM 40 MILLIGRAM(S): 100 INJECTION SUBCUTANEOUS at 09:12

## 2021-02-16 RX ADMIN — ESCITALOPRAM OXALATE 10 MILLIGRAM(S): 10 TABLET, FILM COATED ORAL at 12:25

## 2021-02-16 RX ADMIN — Medication 81 MILLIGRAM(S): at 12:25

## 2021-02-16 RX ADMIN — Medication 2 MILLIGRAM(S): at 12:51

## 2021-02-16 NOTE — PROGRESS NOTE ADULT - ASSESSMENT
65 year old male with history of HTN, DM2, HLD, MICHELLE on CPAP, recent COVID + in Jan 2021 presents to ED with concern for persistent chest pain and intermittent shortness of breath with inability to sleep and feeling anxious.  Patient notes he followed up with his PCP and was told his symptoms were due to anxiety. CCTA 2/16/21 pending. Psych saw patient and started Klonopin. Pt to be discharged 2/16/21 vs 2/17/21 pending completion and result of CCTA.

## 2021-02-16 NOTE — PROGRESS NOTE ADULT - PROBLEM SELECTOR PLAN 6
- Holding home Metformin 1000mg PO BID.   - CONT sliding scale.   - A1c 7.4
- Holding home Metformin 1000mg PO BID.   - CONT sliding scale.   - F/U A1c
- Holding home Metformin 1000mg PO BID.   - CONT sliding scale.   - A1c 7.4

## 2021-02-16 NOTE — PROGRESS NOTE ADULT - PROBLEM SELECTOR PROBLEM 7
MICHELLE (obstructive sleep apnea)

## 2021-02-16 NOTE — PROGRESS NOTE ADULT - PROBLEM SELECTOR PLAN 5
- Home med: Crestor 10mg PO QHS.   - Continue therapeutic interchange Atorvastatin 40mg PO QHS.   - F/U lipid panel. - Home med: Crestor 10mg PO QHS.   - Continue therapeutic interchange Atorvastatin 40mg PO QHS.

## 2021-02-16 NOTE — PROGRESS NOTE ADULT - ATTENDING COMMENTS
Initial attending contact date      . See PA note written above for details. I reviewed the PA documentation. I have personally seen and examined this patient. I reviewed vitals, labs, medications, cardiac studies, and additional imaging. I agree with the above PA's findings and plans as written above with the following additions/statements.      65 year old male with history of HTN, DM2, HLD, MICHELLE on CPAP, recent COVID + in Jan 2021 admitted with atypical CP in setting of severe anxiety  -EKG without ischemic changes, trop neg x 2  -Given RF for CAD, will still opt to r/o CAD with CCTA tuesday. ECHO pending  -BP controlled on amlodipine  -LDL 100s, Atorva 40 mg qd  -Psych to consult for severe anxiety        Kavitha Gilbert MD  Cardiology attending
Initial attending contact date      . See PA note written above for details. I reviewed the PA documentation. I have personally seen and examined this patient. I reviewed vitals, labs, medications, cardiac studies, and additional imaging. I agree with the above PA's findings and plans as written above with the following additions/statements.      65 year old male with history of HTN, DM2, HLD, MICHELLE on CPAP, recent COVID + in Jan 2021 admitted with atypical CP in setting of severe anxiety  -EKG without ischemic changes, trop neg x 2  -Given RF for CAD, will still opt to r/o CAD with CCTA tuesday. ECHO pending  -BP controlled on amlodipine  -LDL 100s, Atorva 40 mg qd  -Psych to consult for severe anxiety. On roseanna Gilbert MD  Cardiology attending
. See PA note written above for details. I reviewed the PA documentation. I have personally seen and examined this patient. I reviewed vitals, labs, medications, cardiac studies, and additional imaging. I agree with the above PA's findings and plans as written above

## 2021-02-16 NOTE — PROGRESS NOTE ADULT - PROBLEM SELECTOR PLAN 2
- Pt had COVID diagnosed January 21st.  - COVID-PCR (+) on admit 2/13/21 – asymptomatic, likely still shedding virus.   - Inflammatory markers unremarkable.
- Pt had COVID diagnosed January 21st.   - No O2 supplementation requirement at this time.   - COVID-PCR (+) on admit 2/13/21 – asymptomatic, likely still shedding virus.   - Inflammatory markers unremarkable.   - Monitor.
- Pt had COVID diagnosed January 21st.   - No O2 supplementation requirement at this time.   - COVID-PCR (+) on admit 2/13/21 – asymptomatic, likely still shedding virus.   - Inflammatory markers unremarkable.   - Monitor.

## 2021-02-16 NOTE — PROGRESS NOTE ADULT - PROBLEM SELECTOR PLAN 1
- CP free at this time; HD stable; lying comfortable in no acute distress; physical exam benign.   - Trop (-) x3; EKGs non-ischemic; inflammatory markers unremarkable; telemetry unremarkable.   - CCTA 2/16/21 - F/U results

## 2021-02-16 NOTE — PROGRESS NOTE ADULT - PROBLEM SELECTOR PLAN 3
- Pt severely anxious.   - Reports he saw his PCP for his concerns, and his PCP said it was anxiety.   - Pt reporting panic attacks.   - PSYCH consulted - Klonopin 0.5mg PO q12hrs, Gabapentin 600mg PO QHS  - May use Ativan 2mg q6hrs PRN for breakthrough.   - F/U w/ psych recommendations for follow up.   - Continue following psych recs. - Pt severely anxious.   - PSYCH consulted - Klonopin 0.5mg PO q12hrs, Gabapentin 600mg PO QHS  - May use Ativan 2mg q6hrs PRN for breakthrough.  - ***Pt needs to follow up with [facility] for follow up.  - Klonopin prescription has been sent by patient's PCP, Dr. Nathan.

## 2021-02-16 NOTE — PROGRESS NOTE ADULT - SUBJECTIVE AND OBJECTIVE BOX
Interventional Cardiology PA Adult Progress Note    Subjective Assessment: Pt seen and evaluated at bedside. Appears comfortable and offers no complaints at this time.   	  MEDICATIONS:  amLODIPine   Tablet 5 milliGRAM(s) Oral daily  acetaminophen   Tablet .. 650 milliGRAM(s) Oral every 6 hours PRN  clonazePAM  Tablet 0.5 milliGRAM(s) Oral every 12 hours  escitalopram 10 milliGRAM(s) Oral daily  gabapentin 600 milliGRAM(s) Oral at bedtime  pantoprazole    Tablet 40 milliGRAM(s) Oral before breakfast  atorvastatin 40 milliGRAM(s) Oral at bedtime  dextrose 40% Gel 15 Gram(s) Oral once  dextrose 50% Injectable 25 Gram(s) IV Push once  dextrose 50% Injectable 12.5 Gram(s) IV Push once  dextrose 50% Injectable 25 Gram(s) IV Push once  glucagon  Injectable 1 milliGRAM(s) IntraMuscular once  insulin lispro (ADMELOG) corrective regimen sliding scale   SubCutaneous Before meals and at bedtime  artificial  tears Solution 1 Drop(s) Both EYES every 6 hours PRN  aspirin enteric coated 81 milliGRAM(s) Oral daily  dextrose 5%. 1000 milliLiter(s) IV Continuous <Continuous>  dextrose 5%. 1000 milliLiter(s) IV Continuous <Continuous>  enoxaparin Injectable 40 milliGRAM(s) SubCutaneous every 12 hours        PHYSICAL EXAM:  TELEMETRY: no events on telemetry   T(C): 36.4 (02-16-21 @ 17:36), Max: 37 (02-16-21 @ 01:25)  HR: 64 (02-16-21 @ 18:09) (61 - 70)  BP: 118/80 (02-16-21 @ 18:09) (110/51 - 138/78)  RR: 20 (02-16-21 @ 18:09) (18 - 23)  SpO2: 97% (02-16-21 @ 18:09) (95% - 98%)  Wt(kg): --  I&O's Summary    15 Feb 2021 07:01  -  16 Feb 2021 07:00  --------------------------------------------------------  IN: 0 mL / OUT: 250 mL / NET: -250 mL    16 Feb 2021 07:01  -  16 Feb 2021 18:15  --------------------------------------------------------  IN: 0 mL / OUT: 900 mL / NET: -900 mL      Appearance: Normal	  HEENT:   Normal oral mucosa, PERRL, EOMI	  Neck: Supple, - JVD; Carotid Bruit   Cardiovascular: Normal S1 S2, No JVD, No murmurs,   Respiratory: Lungs clear to auscultation  Gastrointestinal:  Soft, Non-tender, + BS	  Skin: No rashes, No ecchymoses, No cyanosis  Extremities: Normal range of motion, No clubbing, cyanosis or edema  Vascular: Peripheral pulses palpable 2+ bilaterally  Neurologic: Non-focal  Psychiatry: A & O x 3, Mood & affect appropriate        LABS:	 	  CARDIAC MARKERS:                        13.3   5.84  )-----------( 218      ( 16 Feb 2021 06:14 )             40.9     140  |  103  |  17  ----------------------------<  132<H>  4.2   |  26  |  0.90    Ca    8.8      16 Feb 2021 06:14  Mg     1.9     02-16    TPro  7.0  /  Alb  3.9  /  TBili  0.4  /  DBili  x   /  AST  31  /  ALT  66<H>  /  AlkPhos  62  02-16

## 2021-02-17 ENCOUNTER — TRANSCRIPTION ENCOUNTER (OUTPATIENT)
Age: 66
End: 2021-02-17

## 2021-02-17 VITALS
HEART RATE: 72 BPM | OXYGEN SATURATION: 92 % | RESPIRATION RATE: 18 BRPM | SYSTOLIC BLOOD PRESSURE: 122 MMHG | DIASTOLIC BLOOD PRESSURE: 75 MMHG | TEMPERATURE: 98 F

## 2021-02-17 LAB
GLUCOSE BLDC GLUCOMTR-MCNC: 115 MG/DL — HIGH (ref 70–99)
GLUCOSE BLDC GLUCOMTR-MCNC: 158 MG/DL — HIGH (ref 70–99)

## 2021-02-17 PROCEDURE — 85652 RBC SED RATE AUTOMATED: CPT

## 2021-02-17 PROCEDURE — 82553 CREATINE MB FRACTION: CPT

## 2021-02-17 PROCEDURE — 99239 HOSP IP/OBS DSCHRG MGMT >30: CPT

## 2021-02-17 PROCEDURE — 80053 COMPREHEN METABOLIC PANEL: CPT

## 2021-02-17 PROCEDURE — 85379 FIBRIN DEGRADATION QUANT: CPT

## 2021-02-17 PROCEDURE — 94660 CPAP INITIATION&MGMT: CPT

## 2021-02-17 PROCEDURE — 82728 ASSAY OF FERRITIN: CPT

## 2021-02-17 PROCEDURE — 84484 ASSAY OF TROPONIN QUANT: CPT

## 2021-02-17 PROCEDURE — 85025 COMPLETE CBC W/AUTO DIFF WBC: CPT

## 2021-02-17 PROCEDURE — 86140 C-REACTIVE PROTEIN: CPT

## 2021-02-17 PROCEDURE — 83735 ASSAY OF MAGNESIUM: CPT

## 2021-02-17 PROCEDURE — 71275 CT ANGIOGRAPHY CHEST: CPT

## 2021-02-17 PROCEDURE — 75574 CT ANGIO HRT W/3D IMAGE: CPT

## 2021-02-17 PROCEDURE — 72125 CT NECK SPINE W/O DYE: CPT

## 2021-02-17 PROCEDURE — 36415 COLL VENOUS BLD VENIPUNCTURE: CPT

## 2021-02-17 PROCEDURE — 85610 PROTHROMBIN TIME: CPT

## 2021-02-17 PROCEDURE — 80061 LIPID PANEL: CPT

## 2021-02-17 PROCEDURE — 85027 COMPLETE CBC AUTOMATED: CPT

## 2021-02-17 PROCEDURE — 82962 GLUCOSE BLOOD TEST: CPT

## 2021-02-17 PROCEDURE — U0003: CPT

## 2021-02-17 PROCEDURE — 82550 ASSAY OF CK (CPK): CPT

## 2021-02-17 PROCEDURE — 70450 CT HEAD/BRAIN W/O DYE: CPT

## 2021-02-17 PROCEDURE — 83036 HEMOGLOBIN GLYCOSYLATED A1C: CPT

## 2021-02-17 PROCEDURE — U0005: CPT

## 2021-02-17 PROCEDURE — 96374 THER/PROPH/DIAG INJ IV PUSH: CPT | Mod: XU

## 2021-02-17 PROCEDURE — 93005 ELECTROCARDIOGRAM TRACING: CPT

## 2021-02-17 PROCEDURE — 86803 HEPATITIS C AB TEST: CPT

## 2021-02-17 PROCEDURE — 99285 EMERGENCY DEPT VISIT HI MDM: CPT | Mod: 25

## 2021-02-17 PROCEDURE — 85730 THROMBOPLASTIN TIME PARTIAL: CPT

## 2021-02-17 RX ADMIN — PANTOPRAZOLE SODIUM 40 MILLIGRAM(S): 20 TABLET, DELAYED RELEASE ORAL at 06:02

## 2021-02-17 RX ADMIN — Medication 1: at 12:16

## 2021-02-17 RX ADMIN — Medication 81 MILLIGRAM(S): at 12:15

## 2021-02-17 RX ADMIN — Medication 650 MILLIGRAM(S): at 10:20

## 2021-02-17 RX ADMIN — Medication 0.5 MILLIGRAM(S): at 06:02

## 2021-02-17 RX ADMIN — ENOXAPARIN SODIUM 40 MILLIGRAM(S): 100 INJECTION SUBCUTANEOUS at 10:06

## 2021-02-17 RX ADMIN — ESCITALOPRAM OXALATE 10 MILLIGRAM(S): 10 TABLET, FILM COATED ORAL at 12:15

## 2021-02-17 RX ADMIN — AMLODIPINE BESYLATE 5 MILLIGRAM(S): 2.5 TABLET ORAL at 06:02

## 2021-02-17 NOTE — DISCHARGE NOTE NURSING/CASE MANAGEMENT/SOCIAL WORK - NSDCFUADDAPPT_GEN_ALL_CORE_FT
(1) Please follow up and establish care with cardiologist, Dr. Skinner  (2) Please make an appointment with the [facility]. *BE SURE TO BRING YOUR DISCHARGE PAPERWORK** They have a walk in clinic.   (3) Please make an appointment to follow up with your primary care doctor, Dr. Nathan.

## 2021-02-17 NOTE — DISCHARGE NOTE NURSING/CASE MANAGEMENT/SOCIAL WORK - PATIENT PORTAL LINK FT
You can access the FollowMyHealth Patient Portal offered by [facility] by registering at the following website: [email or web address]. By joining "[employer]’s FollowMyHealth portal, you will also be able to view your health information using other applications (apps) compatible with our system.

## 2021-02-19 ENCOUNTER — TRANSCRIPTION ENCOUNTER (OUTPATIENT)
Age: 66
End: 2021-02-19

## 2021-02-23 DIAGNOSIS — F41.1 GENERALIZED ANXIETY DISORDER: ICD-10-CM

## 2021-02-23 DIAGNOSIS — E11.9 TYPE 2 DIABETES MELLITUS WITHOUT COMPLICATIONS: ICD-10-CM

## 2021-02-23 DIAGNOSIS — R07.9 CHEST PAIN, UNSPECIFIED: ICD-10-CM

## 2021-02-23 DIAGNOSIS — E78.5 HYPERLIPIDEMIA, UNSPECIFIED: ICD-10-CM

## 2021-02-23 DIAGNOSIS — F41.0 PANIC DISORDER [EPISODIC PAROXYSMAL ANXIETY]: ICD-10-CM

## 2021-02-23 DIAGNOSIS — I10 ESSENTIAL (PRIMARY) HYPERTENSION: ICD-10-CM

## 2021-02-23 DIAGNOSIS — Z79.84 LONG TERM (CURRENT) USE OF ORAL HYPOGLYCEMIC DRUGS: ICD-10-CM

## 2021-02-23 DIAGNOSIS — G47.33 OBSTRUCTIVE SLEEP APNEA (ADULT) (PEDIATRIC): ICD-10-CM

## 2021-02-23 DIAGNOSIS — E66.3 OVERWEIGHT: ICD-10-CM

## 2021-02-23 DIAGNOSIS — U07.1 COVID-19: ICD-10-CM

## 2021-02-23 DIAGNOSIS — Z86.16 PERSONAL HISTORY OF COVID-19: ICD-10-CM

## 2021-03-26 NOTE — BEHAVIORAL HEALTH ASSESSMENT NOTE - RECENT MEMORY
Impression: Other secondary cataract, left eye: H26.492. Condition: established, worsening. Symptoms: could improve with surgery. Plan: Discussed diagnosis with patient. Recommend YAG OS then OD laser treatment. Patient understands and wishes to proceed.
Impression: Other secondary cataract, right eye: H26.491. Condition: established, worsening. Symptoms: could improve with surgery.  Plan: as above
Normal

## 2021-08-29 NOTE — PROGRESS NOTE BEHAVIORAL HEALTH - ABNORMAL MOVEMENTS
Patient : Raj Lester Age: 40 year old Sex: male   MRN: [id number] Encounter Date: 8/28/2021    [id number]    History     Chief Complaint   Patient presents with   • Chest Pain Adult     HPI  8/28/2021  7:41 PM Raj Lester is a 40 year old male with a PMHx that is significant for A-fib who presents to the ED for evaluation of CP that began 45 PTA. Says he was taking a nap and then woke up to the pain. The pain radiates to his arms and shoulders. \"He keeps fucking up because of his anxiety sx.\" He reports \"every time I go out to have a good time I feel like shit the next day.\" His last line of cocaine was this morning. For the past 5 days he has drank \"a lot a lot a lot\" of ETOH.  He denies other illicit substances and marijuana. He reports SOB, anxiety sx, dry mouth, and nausea. He denies urinary problems, fever, vomiting, diarrhea, or any other associated sx. The pt verbalizes no further complaints or modifying factors at this time.    PCP: Alexandria No MD    7:41 PM I reviewed the patient's medications, allergies, and past medical and surgical history in Epic. PMHx GERD and emphysema. Last ED visit 9/6 2020 for a bee sting. 7/1/2018 stress test which showed no echocardiographic evidence of myocardial ischemia and normal response to Dobutamine.  His care everywhere shows that he was just at [place] 8/26 for cocaine abuse. Identical presentation. He was given IV fluids and ativan. EP service revealed pt's holter monitor and he was in NSR.     Allergies   Allergen Reactions   • Bee Sting SWELLING       Current Discharge Medication List      Prior to Admission Medications    Details   pantoprazole (Protonix) 40 MG tablet Take 1 tablet by mouth daily.  Qty: 90 tablet, Refills: 0      diphenhydrAMINE (Benadryl Allergy) 25 MG tablet Take 1 tablet by mouth every 6 hours as needed for Itching or Allergies.  Qty: 20 tablet, Refills: 0      predniSONE (DELTASONE) 20 MG tablet Take 2 tablets by mouth daily. Do not start  before 2020.  Qty: 10 tablet, Refills: 0      sucralfate (CARAFATE) 1 g tablet Take 1 tablet by mouth 3 times daily (before meals).  Qty: 90 tablet, Refills: 5      tiotropium-olodaterol (STIOLTO RESPIMAT) 2.5-2.5 MCG/ACT inhaler Inhale 2 puffs into the lungs daily.  Qty: 4 g, Refills: 5      albuterol 108 (90 Base) MCG/ACT inhaler Inhale 2 puffs into the lungs every 4 hours as needed for Wheezing (cough).  Qty: 8.5 g, Refills: 5      EPINEPHrine (EPIPEN 2-EMMANUELLE) 0.3 MG/0.3ML auto-injector Inject 0.3 mLs into the muscle 1 time as needed for Anaphylaxis.  Qty: 2 each, Refills: 0             Past Medical History:   Diagnosis Date   • Chronic pain     r shoulder, neck   • GERD (gastroesophageal reflux disease)    • Right wrist fracture    • Rotator cuff syndrome of right shoulder    • Tobacco dependence        Past Surgical History:   Procedure Laterality Date   • ABCESS DRAINAGE      throat/ 2010-approx.   • SHOULDER ARTHROSCOPY W/ SUPERIOR LABRAL ANTERIOR POSTERIOR REPAIR  10/09/2012    right     History reviewed. Pt denies pertinent family history.    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 Negative for fever.   HENT: Negative for congestion and sore throat.         Positive for dry mouth   Eyes: Negative for visual disturbance.    Respiratory: Positive for shortness of breath. Negative for cough.    Cardiovascular: Positive for chest pain.   Gastrointestinal: Positive for nausea. Negative for abdominal pain, diarrhea and vomiting.   Genitourinary: Negative for dysuria, frequency, hematuria and urgency.   Musculoskeletal: Negative for back pain.        Positive for arm and shoulder pain   Allergic/Immunologic: Negative for immunocompromised state.   Neurological: Negative for headaches.   Hematological: Does not bruise/bleed easily.   Psychiatric/Behavioral: Negative for confusion. The patient is nervous/anxious.        Physical Exam     ED Triage Vitals   ED Triage Vitals Group      Temp 08/28/21 1954 98 °F (36.7 °C)      Heart Rate 08/28/21 1954 94      Resp 08/28/21 1954 (!) 28      BP 08/28/21 1954 (!) 147/82      SpO2 08/28/21 1954 97 %      EtCO2 mmHg --       Height --       Weight 08/28/21 1955 189 lb 9.5 oz (86 kg)      Weight Scale Used 08/28/21 1955 Scale in bed      BMI (Calculated) --       IBW/kg (Calculated) --        Physical Exam  Vitals reviewed.   Constitutional:       General: He is not in acute distress.     Appearance: He is not toxic-appearing.   HENT:      Head: Normocephalic.      Right Ear: External ear normal.      Left Ear: External ear normal.      Nose: Nose normal.      Mouth/Throat:      Lips: Pink.   Eyes:      Conjunctiva/sclera: Conjunctivae normal.   Cardiovascular:      Rate and Rhythm: Normal rate and regular rhythm.      Heart sounds: Normal heart sounds. No murmur heard.     Pulmonary:      Effort: Pulmonary effort is normal.      Breath sounds: Normal breath sounds. No decreased breath sounds, wheezing, rhonchi or rales.   Abdominal:      General: There is no distension.      Palpations: Abdomen is soft.      Tenderness: There is no abdominal tenderness. There is no guarding.   Musculoskeletal:         General: Normal range of motion.      Cervical back: Full passive range of motion without pain.    Skin:     General: Skin is warm and dry.      Capillary Refill: Capillary refill takes less than 2 seconds.   Neurological:      General: No focal deficit present.      Mental Status: He is alert and oriented to person, place, and time.   Psychiatric:         Attention and Perception: He is inattentive.         Mood and Affect: Mood is anxious.         Behavior: Behavior is cooperative.      Comments: Rapid speech         ED Course     Procedures    Lab Results     Results for orders placed or performed during the hospital encounter of 08/28/21   Comprehensive Metabolic Panel   Result Value Ref Range    Fasting Status      Sodium 139 135 - 145 mmol/L    Potassium 3.7 3.4 - 5.1 mmol/L    Chloride 104 98 - 107 mmol/L    Carbon Dioxide 23 21 - 32 mmol/L    Anion Gap 16 10 - 20 mmol/L    Glucose 100 (H) 65 - 99 mg/dL    BUN 14 6 - 20 mg/dL    Creatinine 0.67 0.67 - 1.17 mg/dL    Glomerular Filtration Rate >90 >=60    BUN/ Creatinine Ratio 21 7 - 25    Calcium 8.5 8.4 - 10.2 mg/dL    Bilirubin, Total 0.6 0.2 - 1.0 mg/dL    GOT/AST 27 <=37 Units/L    GPT/ALT 35 <64 Units/L    Alkaline Phosphatase 84 45 - 117 Units/L    Albumin 4.0 3.6 - 5.1 g/dL    Protein, Total 8.1 6.4 - 8.2 g/dL    Globulin 4.1 (H) 2.0 - 4.0 g/dL    A/G Ratio 1.0 1.0 - 2.4   Lipase   Result Value Ref Range    Lipase 52 (L) 73 - 393 Units/L   Alcohol   Result Value Ref Range    Alcohol 159 (H) <3 mg/dL   Drug Abuse Screen, Urine   Result Value Ref Range    Amphetamines, Urine Positive (A) Negative    Barbiturates, Urine Negative Negative    Benzodiazepines, Urine Negative Negative    Cocaine/ Metabolite, Urine Positive (A) Negative    Opiates, Urine Negative Negative    Phencyclidine, Urine Negative Negative    Cannabinoids, Urine Negative Negative   CBC with Automated Differential (performable only)   Result Value Ref Range    WBC 6.7 4.2 - 11.0 K/mcL    RBC 4.77 4.50 - 5.90 mil/mcL    HGB 15.4 13.0 - 17.0 g/dL    HCT 43.0 39.0 - 51.0 %    MCV 90.1  78.0 - 100.0 fl    MCH 32.3 26.0 - 34.0 pg    MCHC 35.8 32.0 - 36.5 g/dL    RDW-CV 11.8 11.0 - 15.0 %    RDW-SD 38.8 (L) 39.0 - 50.0 fL     140 - 450 K/mcL    NRBC 0 <=0 /100 WBC    Neutrophil, Percent 66 %    Lymphocytes, Percent 26 %    Mono, Percent 8 %    Eosinophils, Percent 0 %    Basophils, Percent 0 %    Immature Granulocytes 0 %    Absolute Neutrophils 4.4 1.8 - 7.7 K/mcL    Absolute Lymphocytes 1.8 1.0 - 4.8 K/mcL    Absolute Monocytes 0.5 0.3 - 0.9 K/mcL    Absolute Eosinophils  0.0 0.0 - 0.5 K/mcL    Absolute Basophils 0.0 0.0 - 0.3 K/mcL    Absolute Immmature Granulocytes 0.0 0.0 - 0.2 K/mcL   Gold Top   Result Value Ref Range    Extra Tube Hold for Add Ons    ISTAT8 VENOUS  POINT OF CARE   Result Value Ref Range    BUN - POINT OF CARE 14 6 - 20 mg/dL    SODIUM - POINT OF CARE 141 135 - 145 mmol/L    POTASSIUM - POINT OF CARE 3.7 3.4 - 5.1 mmol/L    CHLORIDE - POINT OF CARE 102 98 - 107 mmol/L    TCO2 - POINT OF CARE 23 19 - 24 mmol/L    ANION GAP - POINT OF CARE 21 (H) 10 - 20 mmol/L    HEMATOCRIT - POINT OF CARE 46.0 39.0 - 51.0 %    HEMOGLOBIN - POINT OF CARE 15.6 13.0 - 17.0 g/dL    GLUCOSE - POINT OF CARE 101 (H) 70 - 99 mg/dL    CALCIUM, IONIZED - POINT OF CARE 1.08 (L) 1.15 - 1.29 mmol/L    Creatinine 0.90 0.67 - 1.17 mg/dL    Glomerular Filtration Rate >90 >=60   TROPONIN I  POINT OF CARE   Result Value Ref Range    TROPONIN I - POINT OF CARE <0.10 <0.10 ng/mL   TROPONIN I  POINT OF CARE   Result Value Ref Range    TROPONIN I - POINT OF CARE <0.10 <0.10 ng/mL       EKG Results     Results for orders placed or performed during the hospital encounter of 08/28/21   Electrocardiogram 12-Lead   Result Value Ref Range    Ventricular Rate EKG/Min (BPM) 94     Atrial Rate (BPM) 94     GA-Interval (MSEC) 152     QRS-Interval (MSEC) 96     QT-Interval (MSEC) 362     QTc 453     P Axis (Degrees) 52     R Axis (Degrees) 57     T Axis (Degrees) 42     REPORT TEXT       Normal sinus rhythm  Normal  ECG  When compared with ECG of  06-SEP-2020 12:15,  No significant change was found  Confirmed by WILBERT HANNA MD ([id number]),  Gary Medina ([id number]) on 8/28/2021 8:01:26 PM       Radiology Results     Imaging Results          XR CHEST AP OR PA - PORTABLE (Final result)  Result time 08/28/21 20:18:16    Final result                 Impression:    IMPRESSION: No acute radiographic finding.                Narrative:    XR CHEST AP OR PA    HISTORY: sob, rule out pna    COMPARISON:  February 19, 2020 radiograph    FINDINGS:    Lungs/Pleura:  Radiographically clear.   Mediastinum: No radiographic evidence of lymphadenopathy. No evidence of  cardiac silhouette enlargement.  Bones: No acute fracture.  Other: None.                                ED Medication Orders (From admission, onward)    Ordered Start     Status Ordering Provider    08/28/21 1958 08/28/21 1959  dextrose 5 % bolus 1,000 mL  ONCE         Last MAR action: Completed WILBERT HANNA    08/28/21 1958 08/28/21 1959  thiamine (VITAMIN B-1) injection 100 mg  ONCE         Last MAR action: Given WILBERT HANNA    08/28/21 1958 08/28/21 1959  folic acid (FOLATE) tablet 1 mg  ONCE         Last MAR action: Given WILBERT HANNA    08/28/21 1958 08/28/21 1959  LORazepam (ATIVAN) injection 1 mg  ONCE         Last MAR action: Given WILBERT HANNA               [facility]    Vitals  Vitals:    08/28/21 2130 08/28/21 2200 08/28/21 2230 08/28/21 2300   BP: 120/65 114/66 111/64 120/72   BP Location:       Patient Position:       Pulse: 80 79 80 85   Resp: 18 17 18 17   Temp:       TempSrc:       SpO2: 92% 91% 92% 92%   Weight:           ED Course  Initial impression: Raj Lester is a 40 year old M here with chest pain in the setting of meth abuse. Broad differential diagnoses considered, including but not limited to: ACS/MI, arrhythmias, PNA, MSK causes (contusion, costochondritis, less likely fracture without precipitating traumatic injury or hx of severe  osteoporosis). Heart score = 1. Less likely PTX given no SOB, no hypoxia on exam, benign pulmonary exam. EKG and clinical history is not c/w pericarditis; additional, pt denies use of any IV illicit substances. No clinical hx of vomiting, no crepitus on examination, no suspicion for esophageal rupture. Consider anxiety as a diagnosis of exclusion.  Initial plan: Labs, EKG, CXR    9:07 PM I rechecked the patient who is laying in bed comfortably. He reports that he feels better but is worried he will relapse when the medication wears off again like last time. We discussed the results of ED workup which is unremarkable. I informed them of the plan to repeat troponin test. Pt understands and agrees with the plan. All questions addressed.     HEART Score for Major Cardiac Events  History: Slightly suspicious   EKG Normal   AGE Less than or equal to 45   RISK FACTOR 1-2 risk factors   TROPONIN: Equal or less than normal limit   TOTAL SCORE 1     Additional Information    Low Risk HEART Score Results:   Discussion and Patient Decision:     The patient's HEART Score is considered to be at low risk for a Major Adverse Cardiac Event (MACE).  The calculated risk is 1.7 % at 6 weeks.    The results of the work up were discussed with patient including the limitations of a single troponin and single ECG assessment.  The patient was offered admission to an ED observation status for serial troponins and serial ECG assessments.  After discussing the risks and benefits, the patient elected to follow up with their personal physician as an outpatient for re-evaluation.  They understand the risks and benefits and are comfortable with being discharged.           MDM  Work-up revealed +cocaine/meth on UDS. Lipase neg. No gross electrolyte derangements. Creatinine WNL. Hyperglycemia expected as this is a non fasting lab. LFTs WNL. Trop neg x2. No leukocytosis. H/H within normal limits. No thrombocytopenia. EKG with NSR no ischemia.    Vitals stable throughout ED course; while he arrived hyperventilating and mildly hypertensive, this improved with 1 dose of ativan  Interventions in the ED included: ativan for anxiety.   Pt was also found to have alcohol up to 159. We ensured that while his alcohol metabolized he was safe and did not go into alcohol withdrawal. During all reassessments he did not and was sleeping comfortably. By time of discharge CIWA = 0, able to ambulate, no more chest pain.     A discharge discussion was held counseling the patient on the likely cause of the symptoms, possible diagnoses, as well as warning signs and symptoms. Patient (and family if present) understand that this is a provisional diagnosis which can, and do, change. The diagnosis that which the patient was discharged with today is based on the symptoms with which they presented. If any new symptoms occur or if symptoms worsen, the patient understands that they must seek immediate attention for re-evaluation. We discussed the lack of likely further emergent process requiring further work-up and management, and at home instructions, which included:  -- Prescription(s): none  -- Follow-up: pcp  -- Strict return precautions and instructions as outline in detailed patient discharge instructions  At time of discharge, pt was hemodynamically stable, functional/ambulating at baseline levels, and pain (if any) and other symptoms were adequately controlled on oral regimens as appropriate. Appropriate diagnostic study results were reviewed with patient and any present family members and all questions answered to the best of the medical team's knowledge. Patient indicated acceptance and understanding of the plan for discharge. Pt was discharged in stable condition.    Critical Care time spent on this patient outside of billable procedures:  None    Clinical Impression  ED Diagnoses        Final diagnoses    Chest pain, unspecified type          Cocaine abuse (CMS/[facility])           Alcohol abuse                Follow Up:  Alexandria No MD  [address] [facility] [address]  [phone]    Schedule an appointment as soon as possible for a visit in 2 days      [facility]  [address] [facility] [address]  [phone]  Go to   As needed, If symptoms worsen          Summary of your Discharge Medications      You have not been prescribed any medications.         Pt is discharged to home/self care in stable condition.     I have reviewed the information recorded by the scribe for accuracy and agree with its contents.    ____________________________________________________________________    Gary Medina acting as a scribe for Dr. Laci Silva  Dictation [id number]  Scribe: Gary Silva MD  08/29/21 0354     No abnormal movements

## 2022-05-30 NOTE — PROGRESS NOTE ADULT - PROBLEM SELECTOR PLAN 1
Patient : Esmer Huerta Age: 14 month old Sex: female   MRN: [id number] Encounter Date: 2022      History     Chief Complaint   Patient presents with   • Breathing Problem     HPI     This is a pleasant 14-month-old female that is brought in for evaluation of crying.  Patient has been dealing with runny nose and congestion for the past few days.  Patient has been seen in both clinic and emergency department for these symptoms.  She currently is on erythromycin ointment for a conjunctivitis.  Patient has been awakening during the night crying.  Tonight, patient was crying for over an hour.  Father states patient did not stop crying until they pulled into the emergency department parking lot.  Patient had viral panel performed last night which came back negative.    No Known Allergies    Current Discharge Medication List      Discontinued Medications       erythromycin (ILOTYCIN) ophthalmic ointment              Past Medical History:   Diagnosis Date   • Single liveborn, born in hospital, delivered by  section 2019       No past surgical history on file.    No family history on file.    Social History     Tobacco Use   • Smoking status: Passive Smoke Exposure - Never Smoker   • Smokeless tobacco: Never Used   • Tobacco comment: smokes outside    Vaping Use   • Vaping Use: never used   • Substances: Nicotine   • Devices: Refillable tank   Substance Use Topics   • Alcohol use: Not on file   • Drug use: Not on file       E-cigarette/Vaping   • E-Cigarette/Vaping Use Never Used    • Passive Exposure No    • Counseling Given No      E-Cigarette/Vaping Substances & Devices   • Nicotine Yes    • THC No    • CBD No    • Flavoring No    • Disposable No    • Pre-filled or Refillable Cartridge No    • Refillable Tank Yes    • Pre-filled Pod No        Review of Systems     Positive for runny nose, congestion, eye redness, occasional cough.  Negative for fever, difficulty breathing, vomiting, decreased oral  intake, decreased mental status, decreased urine output.  Ten systems reviewed all negative with the exception previously noted    Physical Exam     ED Triage Vitals [05/30/22 0150]   ED Triage Vitals Group      Temp 97.2 °F (36.2 °C)      Heart Rate 125      Resp 30      BP       SpO2 98 %      EtCO2 mmHg       Height       Weight 24 lb 7.5 oz (11.1 kg)      Weight Scale Used Infant scale      BMI (Calculated)       IBW/kg (Calculated)        Physical Exam  Vitals and nursing note reviewed.   Constitutional:       General: She is active.      Appearance: She is well-developed. She is not toxic-appearing.      Comments: Patient is happy, alert, playful   HENT:      Head: Normocephalic and atraumatic.      Right Ear: Tympanic membrane normal.      Left Ear: Tympanic membrane normal.      Nose: Congestion and rhinorrhea present.      Mouth/Throat:      Mouth: Mucous membranes are moist.      Pharynx: Posterior oropharyngeal erythema present.   Eyes:      Conjunctiva/sclera:      Right eye: Right conjunctiva is injected.      Left eye: Left conjunctiva is injected.      Pupils: Pupils are equal, round, and reactive to light.   Cardiovascular:      Rate and Rhythm: Normal rate and regular rhythm.      Heart sounds: S1 normal and S2 normal. No murmur heard.  Pulmonary:      Effort: Pulmonary effort is normal. No respiratory distress.      Breath sounds: Normal breath sounds. No stridor. No wheezing, rhonchi or rales.   Abdominal:      General: Bowel sounds are normal.      Palpations: Abdomen is soft. There is no mass.      Tenderness: There is no abdominal tenderness. There is no guarding or rebound.   Musculoskeletal:         General: Normal range of motion.      Cervical back: Normal range of motion and neck supple.   Skin:     General: Skin is warm.      Coloration: Skin is not jaundiced.      Findings: No rash.   Neurological:      Mental Status: She is alert.              ED Course     Procedures    Lab Results      Results for orders placed or performed during the hospital encounter of 05/30/22   Streptococcus group A PCR    Specimen: Throat; Swab   Result Value Ref Range    STREPTOCOCCUS GROUP A PCR Not Detected Not Detected        Laboratory results reviewed by this physician.    EKG Results       Radiology Results     Imaging Results          XR Chest PA and Lateral (Final result)  Result time 05/30/22 02:29:11    Final result                 Impression:    Impression:    Perihilar interstitial prominence diagnostic considerations include viral  infectious etiologies versus reactive airway disease.               Narrative:    Exam: XR CHEST PA AND LATERAL    Indication: cough    Comparison: April 14, 2022        Findings:        The pulmonary vascularity is normal.    The cardiomediastinal silhouette is unremarkable.     There are no pleural effusions, focal consolidations, or pneumothoraces.    No acute osseous findings.                                  Chest x-ray independently visualized by this physician.    ED Medication Orders (From admission, onward)    None               MDM     Father was provided with reassurance regarding patient's overall well appearance and normal vital signs.  He is concerned that patient may have a pneumonia and is requesting chest x-ray.    Chest x-ray results were discussed with father.    Patient remained happy, alert, and playful throughout her stay.    Father was instructed on continued symptomatic treatment.  He will be obtaining a cool mist vaporizer for use at bedside.    Clinical Impression     ED Diagnosis   1. Acute URI         Disposition        Discharge 5/30/2022  2:36 AM  Esmer Huerta discharge to home/self care.      Patient should return sooner for difficulty breathing, fever, worsening symptoms other concerns.    Diagnosis  The encounter diagnosis was Acute URI.    Follow Up:  Stacy Schmitt MD  [address]  [place] [address] [phone]    In 1  day         Current Discharge Medication List          Disposition:  Discharge 5/30/2022  2:36 AM  Esmer Huerta discharge to home/self care.                         Sg Brenner,   05/30/22 0239     - Pt presents w/ CP; intermittent, independent of exertion, sharp, midsternal, non-radiating; has been occurring intermittently for a few months.   - CP free at this time; HD stable; lying comfortable in no acute distress; physical exam benign.   - Trop (-) x3; EKGs non-ischemic; inflammatory markers unremarkable; telemetry unremarkable.   - CCTA and ECHO ordered - f/u results (will be done 2/16).

## 2022-10-03 NOTE — CHART NOTE - NSCHARTNOTEFT_GEN_A_CORE
"Subjective:     CC:  Diagnoses of Encounter for medical examination to establish care, Pure hypercholesterolemia, Environmental allergies, Menopausal syndrome on hormone replacement therapy, Immunity status testing, and Major depressive disorder with single episode, in full remission (HCC) were pertinent to this visit.    HISTORY OF THE PRESENT ILLNESS: Patient is a 56 y.o. female. This pleasant patient is here today to establish care and discuss allergies    Problem   Pure Hypercholesterolemia    6/4/2021 total cholesterol 230, triglycerides 76, HDL 84,   She does not take any cholesterol medication at this time.     Environmental Allergies    Patient does not have a history of environmental allergies.  For the last 5 days or so she has had itching in the back of her throat, itchy and watery eyes and nasal congestion.  She tried over-the-counter Flonase and Claritin without any relief.  Added azelastine nasal spray and eyedrops 9/30/2022.     Menopausal Syndrome On Hormone Replacement Therapy    9/23/21:  Gyn has given prempro 0.625/2.5mg. patient stopped because of left breast tenderness  Then given Estradiol trandermal 0.025mg 2x weekly. Has not been taking for the last few months  2/1/22: Taking Prempro 0.3-1.5 mg/tab.  9/30/22: Prempro 0.3-1.5 mg per tab was previously working, although in the last month or so she has had continued hot flashes.  ASCVD risk is less than 5%, still appropriate for oral hormonal therapy replacement     Healthcare Maintenance (Resolved)    November 2021: Cologuard negative  1/5/22: negative mammo (cat 2) 1 yr f/u recommended         Health Maintenance: Completed    ROS:   ROS      Objective:     Exam: /62 (BP Location: Left arm, Patient Position: Sitting, BP Cuff Size: Adult)   Pulse 71   Temp 36.3 °C (97.4 °F) (Temporal)   Ht 1.626 m (5' 4\")   Wt 62.8 kg (138 lb 7.2 oz)   SpO2 98%  Body mass index is 23.76 kg/m².    Physical Exam  Vitals reviewed. " CC: Monoclonal Antibody Infusion/COVID 19 Positive    66 YO male with PMH HTN, DM2, HLD, MICHELLE on CPAP, presents with COVID-19. Pt tested positive on 1/12 and has had symptoms since 1/10 (fever, body aches, malaise, headaches, chills).      exam/findings:  T(C): 37.6 (01-15-21 @ 13:30), Max: 37.6 (01-15-21 @ 13:30)  HR: 80 (01-15-21 @ 13:30) (80 - 80)  BP: 138/79 (01-15-21 @ 13:30) (138/79 - 138/79)  RR: 16 (01-15-21 @ 13:30) (16 - 16)  SpO2: 100% (01-15-21 @ 13:30) (100% - 100%)      PE:   Appearance: NAD	  HEENT:   Normal oral mucosa  Lymphatic: No lymphadenopathy  Cardiovascular: Normal S1 S2, No JVD, No murmurs, No edema  Respiratory: Lungs clear to auscultation B/L  Gastrointestinal:  Soft, Non-tender, + BS	  Skin: warm and dry  Neurologic: Non-focal  Extremities: Normal range of motion, no calf tenderness or edema      ASSESSMENT:  Pt is a 66 YO male, Covid 19 Positive, who presents to infusion center for Monoclonal antibody infusion (Casirivimab/Imdevimab)  Symptoms/ Criteria: fever, malaise, HA  Risk Profile includes: HTN, DM2 in over age 55      PLAN:  - infusion procedure explained to patient   - consent for monoclonal antibody infusion obtained   - risk & benefits discussed/all questions answered  - infuse Casirivimab/Imdevimab 1200mg/1200mg IV over one hour   - observe patient for one hour post infusion        I have reviewed the Casirivimab/Imdevimab Emergency Use Authorization (EAU) and I have provided the patient or patient's caregiver with the following information:  1. FDA has authorized emergency use of Casirivimab/Imdevimab, which is not FDA-approved biologic product.  2. The patient or patient's caregiver has the option to accept or refuse administration of Casirivimab/Imdevimab  3. The significant known and benefits are unknown.  4. Information on available alternative treatments and risks and benefits of those alternatives.        Discharge:  Patient tolerated infusion well denies complaints of chest pain/SOB/dizziness/palpitations.  Vital signs --- for discharge home ---  D/C instructions given/ fact sheet included.  Patient to follow-up with PCP as needed.   Constitutional:       General: She is not in acute distress.     Appearance: She is normal weight. She is not toxic-appearing.   HENT:      Head: Normocephalic and atraumatic.      Right Ear: External ear normal.      Left Ear: External ear normal.      Nose: Nose normal. No congestion.      Mouth/Throat:      Mouth: Mucous membranes are moist.      Pharynx: Oropharynx is clear. No oropharyngeal exudate.   Eyes:      General:         Right eye: No discharge.         Left eye: No discharge.      Extraocular Movements: Extraocular movements intact.      Conjunctiva/sclera: Conjunctivae normal.   Cardiovascular:      Rate and Rhythm: Normal rate and regular rhythm.      Pulses: Normal pulses.      Heart sounds: Normal heart sounds. No murmur heard.  Pulmonary:      Effort: Pulmonary effort is normal. No respiratory distress.      Breath sounds: Normal breath sounds.   Abdominal:      General: Abdomen is flat. Bowel sounds are normal. There is no distension.      Palpations: Abdomen is soft.      Tenderness: There is no abdominal tenderness.   Musculoskeletal:         General: Normal range of motion.   Skin:     General: Skin is warm and dry.      Capillary Refill: Capillary refill takes less than 2 seconds.   Neurological:      Mental Status: She is alert.   Psychiatric:         Mood and Affect: Mood normal.         Behavior: Behavior normal.         Thought Content: Thought content normal.         Judgment: Judgment normal.       Assessment & Plan:   56 y.o. female with the following -    1. Encounter for medical examination to establish care  - Comp Metabolic Panel; Future  - CBC WITH DIFFERENTIAL; Future    2. Pure hypercholesterolemia  Continue to make healthy choices, recheck lipids  - Lipid Profile; Future    3. Environmental allergies  He has had no relief with Flonase, Zyrtec, azelastine nasal spray and eyedrops.  She continues to have eye itching nasal congestion.  She states that when she uses the  azelastine nasal spray she has relief for maybe 1 hour but it comes back quickly.  We have tried all the conservative treatments, I will give her prednisone 40 mg daily for 5 days and mometasone nasal spray.  If these do not relieve her symptoms she will need referral to allergy  - predniSONE (DELTASONE) 20 MG Tab; Take 2 Tablets by mouth every day for 5 days.  Dispense: 10 Tablet; Refill: 0  - mometasone (NASONEX) 50 MCG/ACT nasal spray; Administer 2 Sprays into affected nostril(S) every day.  Dispense: 1 Each; Refill: 2    4. Menopausal syndrome on hormone replacement therapy  Okay on current dosing for now, the next time she needs a refill she may want to discuss increasing    5. Immunity status testing  - HEP B SURFACE AB; Future    6. Major depressive disorder with single episode, in full remission (HCC)  Depression screening negative today, no further treatment needed      Return in about 2 weeks (around 10/17/2022) for labs.    Please note that this dictation was created using voice recognition software. I have made every reasonable attempt to correct obvious errors, but I expect that there are errors of grammar and possibly content that I did not discover before finalizing the note.   CC: Monoclonal Antibody Infusion/COVID 19 Positive    64 YO male with PMH HTN, DM2, HLD, MICHELLE on CPAP, presents with COVID-19. Pt tested positive on 1/12 and has had symptoms since 1/10 (fever, body aches, malaise, headaches, chills).      exam/findings:  T(C): 37.6 (01-15-21 @ 13:30), Max: 37.6 (01-15-21 @ 13:30)  HR: 80 (01-15-21 @ 13:30) (80 - 80)  BP: 138/79 (01-15-21 @ 13:30) (138/79 - 138/79)  RR: 16 (01-15-21 @ 13:30) (16 - 16)  SpO2: 100% (01-15-21 @ 13:30) (100% - 100%)      PE:   Appearance: NAD	  HEENT:   Normal oral mucosa  Lymphatic: No lymphadenopathy  Cardiovascular: Normal S1 S2, No JVD, No murmurs, No edema  Respiratory: Lungs clear to auscultation B/L  Gastrointestinal:  Soft, Non-tender, + BS	  Skin: warm and dry  Neurologic: Non-focal  Extremities: Normal range of motion, no calf tenderness or edema      ASSESSMENT:  Pt is a 64 YO male, Covid 19 Positive, who presents to infusion center for Monoclonal antibody infusion (Casirivimab/Imdevimab)  Symptoms/ Criteria: fever, malaise, HA  Risk Profile includes: HTN, DM2 in over age 55      PLAN:  - infusion procedure explained to patient   - consent for monoclonal antibody infusion obtained   - risk & benefits discussed/all questions answered  - infuse Casirivimab/Imdevimab 1200mg/1200mg IV over one hour   - observe patient for one hour post infusion        I have reviewed the Casirivimab/Imdevimab Emergency Use Authorization (EAU) and I have provided the patient or patient's caregiver with the following information:  1. FDA has authorized emergency use of Casirivimab/Imdevimab, which is not FDA-approved biologic product.  2. The patient or patient's caregiver has the option to accept or refuse administration of Casirivimab/Imdevimab  3. The significant known and benefits are unknown.  4. Information on available alternative treatments and risks and benefits of those alternatives.        Discharge:  Patient tolerated infusion well denies complaints of chest pain/SOB/dizziness/palpitations.  Vital signs Vital Signs Last 24 Hrs  T(C): 37.5 (15 Chris 2021 14:40), Max: 37.7 (15 Chris 2021 13:55)  T(F): 99.5 (15 Chris 2021 14:40), Max: 99.8 (15 Chris 2021 13:55)  HR: 81 (15 Chris 2021 14:40) (80 - 83)  BP: 121/74 (15 Chris 2021 14:40) (121/74 - 139/71)  BP(mean): --  RR: 16 (15 Chris 2021 14:40) (16 - 16)  SpO2: 97% (15 Chris 2021 14:40) (95% - 100%)     PHYSICAL EXAM:    Constitutional: NAD, awake and alert, well-developed, obese  HEENT: Moist Mucous Membranes, Anicteric  Pulmonary: Non-labored, breath sounds are clear bilaterally, No wheezing, rales or rhonchi  Cardiovascular: Regular, S1 and S2, No murmurs, rubs, gallops or clicks  Gastrointestinal: Bowel Sounds present, soft, nontender. Obese abdomen  Lymph: No peripheral edema. No lymphadenopathy.  Skin: No visible rashes or ulcers.  Psych:  Mood & affect appropriate    --- for discharge home ---  D/C instructions given/ fact sheet included.  Patient to follow-up with PCP as needed. CC: Monoclonal Antibody Infusion/COVID 19 Positive    66 YO male with PMH HTN, DM2, HLD, MICHELLE on CPAP, presents with COVID-19. Pt tested positive on 1/12 and has had symptoms since 1/10 (fever, body aches, malaise, headaches, chills).      exam/findings:  T(C): 37.6 (01-15-21 @ 13:30), Max: 37.6 (01-15-21 @ 13:30)  HR: 80 (01-15-21 @ 13:30) (80 - 80)  BP: 138/79 (01-15-21 @ 13:30) (138/79 - 138/79)  RR: 16 (01-15-21 @ 13:30) (16 - 16)  SpO2: 100% (01-15-21 @ 13:30) (100% - 100%)      PE:   Appearance: NAD	  HEENT:   Normal oral mucosa  Lymphatic: No lymphadenopathy  Cardiovascular: Normal S1 S2, No JVD, No murmurs, No edema  Respiratory: Lungs clear to auscultation B/L  Gastrointestinal:  Soft, Non-tender, + BS	  Skin: warm and dry  Neurologic: Non-focal  Extremities: Normal range of motion, no calf tenderness or edema      ASSESSMENT:  Pt is a 66 YO male, Covid 19 Positive, who presents to infusion center for Monoclonal antibody infusion (Casirivimab/Imdevimab)  Symptoms/ Criteria: fever, malaise, HA  Risk Profile includes: HTN, DM2 in over age 55      PLAN:  - infusion procedure explained to patient   - consent for monoclonal antibody infusion obtained   - risk & benefits discussed/all questions answered  - infuse Casirivimab/Imdevimab 1200mg/1200mg IV over one hour   - observe patient for one hour post infusion        I have reviewed the Casirivimab/Imdevimab Emergency Use Authorization (EAU) and I have provided the patient or patient's caregiver with the following information:  1. FDA has authorized emergency use of Casirivimab/Imdevimab, which is not FDA-approved biologic product.  2. The patient or patient's caregiver has the option to accept or refuse administration of Casirivimab/Imdevimab  3. The significant known and benefits are unknown.  4. Information on available alternative treatments and risks and benefits of those alternatives.        Discharge time 2686  Patient tolerated infusion well denies complaints of chest pain/SOB/dizziness/palpitations.  VSS.    PHYSICAL EXAM:    Constitutional: NAD, awake and alert, well-developed, obese  HEENT: Moist Mucous Membranes, Anicteric  Pulmonary: Non-labored, breath sounds are clear bilaterally, No wheezing, rales or rhonchi  Cardiovascular: Regular, S1 and S2, No murmurs, rubs, gallops or clicks  Gastrointestinal: Bowel Sounds present, soft, nontender. Obese abdomen  Lymph: No peripheral edema. No lymphadenopathy.  Skin: No visible rashes or ulcers.  Psych:  Mood & affect appropriate    --- for discharge home ---  D/C instructions given/ fact sheet included.  Patient to follow-up with PCP as needed.

## 2022-10-24 NOTE — PROGRESS NOTE BEHAVIORAL HEALTH - NSBHFUPVIOL_PSY_A_CORE
Body Location Override (Optional - Billing Will Still Be Based On Selected Body Map Location If Applicable): nasal bridge none known

## 2022-11-15 NOTE — ED ADULT TRIAGE NOTE - PAIN RATING/NUMBER SCALE (0-10): REST
Pt is calling because her back is getting puffy and there's drainage. The drainage is bloody, more than it was yesterday. She keeps emptying the tube every two hours or so, says she can feel that her back is puffed up. Denies fever. No bleeding outside of the tube, discomfort at the site of the tube, feels like everything is swollen.    Please advise  Best callback number is [phone]

## 2022-11-17 NOTE — PROGRESS NOTE ADULT - PROBLEM/PLAN-5
DISPLAY PLAN FREE TEXT
- Baseline per outpatient appears to be around 1.2 in May and August  - Presented with 1.53 Cr on 11/11  - Likely iso her volume depleted status given endorsed history of diarrhea and iso urinary retention possibly due to obstruction. Had post-obstructive diuresis following reynolds placement  - Patient now s/p reynolds removal, voiding freely  - Cr this AM of 1.11, improving from admission
- Baseline per outpatient appears to be around 1.2 in May and August  - Presented with 1.53 Cr on 11/11  - Likely iso her volume depleted status given endorsed history of diarrhea and iso urinary retention possibly due to obstruction. Had post-obstructive diuresis following reynolds placement  - Patient now s/p reynolds removal, voiding freely  - Cr this AM of 1.14, improving from admission  - Will get nephrologist rec from PCP when she follows up

## 2022-12-15 ENCOUNTER — EMERGENCY (EMERGENCY)
Facility: HOSPITAL | Age: 67
LOS: 1 days | Discharge: HOME | End: 2022-12-15
Attending: EMERGENCY MEDICINE | Admitting: EMERGENCY MEDICINE
Payer: COMMERCIAL

## 2022-12-15 VITALS
HEART RATE: 76 BPM | OXYGEN SATURATION: 98 % | DIASTOLIC BLOOD PRESSURE: 64 MMHG | SYSTOLIC BLOOD PRESSURE: 119 MMHG | RESPIRATION RATE: 18 BRPM | TEMPERATURE: 99 F

## 2022-12-15 VITALS
RESPIRATION RATE: 18 BRPM | WEIGHT: 315 LBS | OXYGEN SATURATION: 99 % | HEART RATE: 82 BPM | TEMPERATURE: 100 F | DIASTOLIC BLOOD PRESSURE: 76 MMHG | SYSTOLIC BLOOD PRESSURE: 124 MMHG

## 2022-12-15 DIAGNOSIS — Z20.822 CONTACT WITH AND (SUSPECTED) EXPOSURE TO COVID-19: ICD-10-CM

## 2022-12-15 DIAGNOSIS — R06.02 SHORTNESS OF BREATH: ICD-10-CM

## 2022-12-15 DIAGNOSIS — Z79.82 LONG TERM (CURRENT) USE OF ASPIRIN: ICD-10-CM

## 2022-12-15 DIAGNOSIS — E11.9 TYPE 2 DIABETES MELLITUS WITHOUT COMPLICATIONS: ICD-10-CM

## 2022-12-15 DIAGNOSIS — Z79.84 LONG TERM (CURRENT) USE OF ORAL HYPOGLYCEMIC DRUGS: ICD-10-CM

## 2022-12-15 DIAGNOSIS — G47.33 OBSTRUCTIVE SLEEP APNEA (ADULT) (PEDIATRIC): ICD-10-CM

## 2022-12-15 DIAGNOSIS — J10.1 INFLUENZA DUE TO OTHER IDENTIFIED INFLUENZA VIRUS WITH OTHER RESPIRATORY MANIFESTATIONS: ICD-10-CM

## 2022-12-15 DIAGNOSIS — I10 ESSENTIAL (PRIMARY) HYPERTENSION: ICD-10-CM

## 2022-12-15 DIAGNOSIS — E78.5 HYPERLIPIDEMIA, UNSPECIFIED: ICD-10-CM

## 2022-12-15 DIAGNOSIS — R20.0 ANESTHESIA OF SKIN: ICD-10-CM

## 2022-12-15 LAB
ALBUMIN SERPL ELPH-MCNC: 4.1 G/DL — SIGNIFICANT CHANGE UP (ref 3.3–5)
ALP SERPL-CCNC: 53 U/L — SIGNIFICANT CHANGE UP (ref 40–120)
ALT FLD-CCNC: 32 U/L — SIGNIFICANT CHANGE UP (ref 10–45)
ANION GAP SERPL CALC-SCNC: 11 MMOL/L — SIGNIFICANT CHANGE UP (ref 5–17)
AST SERPL-CCNC: 32 U/L — SIGNIFICANT CHANGE UP (ref 10–40)
BASOPHILS # BLD AUTO: 0.01 K/UL — SIGNIFICANT CHANGE UP (ref 0–0.2)
BASOPHILS NFR BLD AUTO: 0.3 % — SIGNIFICANT CHANGE UP (ref 0–2)
BILIRUB SERPL-MCNC: 0.4 MG/DL — SIGNIFICANT CHANGE UP (ref 0.2–1.2)
BUN SERPL-MCNC: 13 MG/DL — SIGNIFICANT CHANGE UP (ref 7–23)
CALCIUM SERPL-MCNC: 8.8 MG/DL — SIGNIFICANT CHANGE UP (ref 8.4–10.5)
CHLORIDE SERPL-SCNC: 104 MMOL/L — SIGNIFICANT CHANGE UP (ref 96–108)
CO2 SERPL-SCNC: 27 MMOL/L — SIGNIFICANT CHANGE UP (ref 22–31)
CREAT SERPL-MCNC: 0.95 MG/DL — SIGNIFICANT CHANGE UP (ref 0.5–1.3)
EGFR: 89 ML/MIN/1.73M2 — SIGNIFICANT CHANGE UP
EOSINOPHIL # BLD AUTO: 0.04 K/UL — SIGNIFICANT CHANGE UP (ref 0–0.5)
EOSINOPHIL NFR BLD AUTO: 1.4 % — SIGNIFICANT CHANGE UP (ref 0–6)
FLUAV AG NPH QL: DETECTED
FLUBV AG NPH QL: SIGNIFICANT CHANGE UP
GLUCOSE SERPL-MCNC: 127 MG/DL — HIGH (ref 70–99)
HCT VFR BLD CALC: 41.5 % — SIGNIFICANT CHANGE UP (ref 39–50)
HGB BLD-MCNC: 13.5 G/DL — SIGNIFICANT CHANGE UP (ref 13–17)
IMM GRANULOCYTES NFR BLD AUTO: 0.3 % — SIGNIFICANT CHANGE UP (ref 0–0.9)
LYMPHOCYTES # BLD AUTO: 1.23 K/UL — SIGNIFICANT CHANGE UP (ref 1–3.3)
LYMPHOCYTES # BLD AUTO: 41.7 % — SIGNIFICANT CHANGE UP (ref 13–44)
MCHC RBC-ENTMCNC: 29.5 PG — SIGNIFICANT CHANGE UP (ref 27–34)
MCHC RBC-ENTMCNC: 32.5 GM/DL — SIGNIFICANT CHANGE UP (ref 32–36)
MCV RBC AUTO: 90.8 FL — SIGNIFICANT CHANGE UP (ref 80–100)
MONOCYTES # BLD AUTO: 0.39 K/UL — SIGNIFICANT CHANGE UP (ref 0–0.9)
MONOCYTES NFR BLD AUTO: 13.2 % — SIGNIFICANT CHANGE UP (ref 2–14)
NEUTROPHILS # BLD AUTO: 1.27 K/UL — LOW (ref 1.8–7.4)
NEUTROPHILS NFR BLD AUTO: 43.1 % — SIGNIFICANT CHANGE UP (ref 43–77)
NRBC # BLD: 0 /100 WBCS — SIGNIFICANT CHANGE UP (ref 0–0)
NT-PROBNP SERPL-SCNC: 54 PG/ML — SIGNIFICANT CHANGE UP (ref 0–300)
PLATELET # BLD AUTO: 179 K/UL — SIGNIFICANT CHANGE UP (ref 150–400)
POTASSIUM SERPL-MCNC: 4.6 MMOL/L — SIGNIFICANT CHANGE UP (ref 3.5–5.3)
POTASSIUM SERPL-SCNC: 4.6 MMOL/L — SIGNIFICANT CHANGE UP (ref 3.5–5.3)
PROT SERPL-MCNC: 7.7 G/DL — SIGNIFICANT CHANGE UP (ref 6–8.3)
RBC # BLD: 4.57 M/UL — SIGNIFICANT CHANGE UP (ref 4.2–5.8)
RBC # FLD: 14 % — SIGNIFICANT CHANGE UP (ref 10.3–14.5)
RSV RNA NPH QL NAA+NON-PROBE: SIGNIFICANT CHANGE UP
SARS-COV-2 RNA SPEC QL NAA+PROBE: SIGNIFICANT CHANGE UP
SODIUM SERPL-SCNC: 142 MMOL/L — SIGNIFICANT CHANGE UP (ref 135–145)
TROPONIN T SERPL-MCNC: 0.01 NG/ML — SIGNIFICANT CHANGE UP (ref 0–0.01)
WBC # BLD: 2.95 K/UL — LOW (ref 3.8–10.5)
WBC # FLD AUTO: 2.95 K/UL — LOW (ref 3.8–10.5)

## 2022-12-15 PROCEDURE — 94640 AIRWAY INHALATION TREATMENT: CPT

## 2022-12-15 PROCEDURE — 80053 COMPREHEN METABOLIC PANEL: CPT

## 2022-12-15 PROCEDURE — 71046 X-RAY EXAM CHEST 2 VIEWS: CPT | Mod: 26

## 2022-12-15 PROCEDURE — 85025 COMPLETE CBC W/AUTO DIFF WBC: CPT

## 2022-12-15 PROCEDURE — 83880 ASSAY OF NATRIURETIC PEPTIDE: CPT

## 2022-12-15 PROCEDURE — 93010 ELECTROCARDIOGRAM REPORT: CPT | Mod: NC

## 2022-12-15 PROCEDURE — 71046 X-RAY EXAM CHEST 2 VIEWS: CPT

## 2022-12-15 PROCEDURE — 84484 ASSAY OF TROPONIN QUANT: CPT

## 2022-12-15 PROCEDURE — 36415 COLL VENOUS BLD VENIPUNCTURE: CPT

## 2022-12-15 PROCEDURE — 99285 EMERGENCY DEPT VISIT HI MDM: CPT | Mod: 25

## 2022-12-15 PROCEDURE — 93005 ELECTROCARDIOGRAM TRACING: CPT

## 2022-12-15 PROCEDURE — 87637 SARSCOV2&INF A&B&RSV AMP PRB: CPT

## 2022-12-15 RX ORDER — ALBUTEROL 90 UG/1
2 AEROSOL, METERED ORAL
Qty: 1 | Refills: 0
Start: 2022-12-15

## 2022-12-15 RX ORDER — IPRATROPIUM/ALBUTEROL SULFATE 18-103MCG
3 AEROSOL WITH ADAPTER (GRAM) INHALATION ONCE
Refills: 0 | Status: COMPLETED | OUTPATIENT
Start: 2022-12-15 | End: 2022-12-15

## 2022-12-15 RX ADMIN — Medication 3 MILLILITER(S): at 17:24

## 2022-12-15 NOTE — ED ADULT NURSE NOTE - OBJECTIVE STATEMENT
Patient complaints of SOB, non-productive cough, chest tightness for 5 days. States when coughing, all symptoms are getting worse.   Denies HA, dizziness.   Patient is alert and oriented, A&O4, steady gait noted.

## 2022-12-15 NOTE — ED ADULT TRIAGE NOTE - CHIEF COMPLAINT QUOTE
Patient c/o worsening shortness of breath, non-productive cough, chest tightness when coughing x 5 days.  Hx DM, HLD

## 2022-12-15 NOTE — ED PROVIDER NOTE - PATIENT PORTAL LINK FT
You can access the FollowMyHealth Patient Portal offered by [facility] by registering at the following website: [email or web address]. By joining [facility]’s FollowMyHealth portal, you will also be able to view your health information using other applications (apps) compatible with our system.

## 2022-12-15 NOTE — ED PROVIDER NOTE - CLINICAL SUMMARY MEDICAL DECISION MAKING FREE TEXT BOX
cough/chest pain, whole body numbness, VSS, will trial nebs, obtain labs reassess. cough/chest pain, whole body numbness, no focal deficits, VSS, consider URI, pna, ?acs, will trial nebs, viral swab, obtain labs, cxr, ekg, reassess.

## 2022-12-15 NOTE — ED PROVIDER NOTE - NSFOLLOWUPINSTRUCTIONS_ED_ALL_ED_FT
[facility]  Family Medicine  [address] [place] [address]  Phone: [phone]    Cough    Coughing is a reflex that clears your throat and your airways. Coughing helps to heal and protect your lungs. It is normal to cough occasionally, but a cough that happens with other symptoms or lasts a long time may be a sign of a condition that needs treatment. Coughing may be caused by infections, asthma or COPD, smoking, postnasal drip, gastroesophageal reflux, as well as other medical conditions. Take medicines only as instructed by your health care provider. Avoid environments or triggers that causes you to cough at work or at home.    SEEK IMMEDIATE MEDICAL CARE IF YOU HAVE ANY OF THE FOLLOWING SYMPTOMS: coughing up blood, shortness of breath, rapid heart rate, chest pain, unexplained weight loss or night sweats.

## 2022-12-15 NOTE — ED PROVIDER NOTE - OBJECTIVE STATEMENT
66 yo hx of dm, merline, htn, hdl w worsening shortness of breath, non-productive cough, chest tightness when coughing x 5 days, whole body numbness, no focal weakness/numbness, speech difficulty. 66 yo hx of dm, merline, htn, hdl w worsening shortness of breath, non-productive cough, chest tightness when coughing x 5 days, whole body numbness, no focal weakness/numbness, speech difficulty. Pt has not tried anything for symptoms, no other aggravating or relieving factors. states vaccinated against covid.

## 2022-12-15 NOTE — ED PROVIDER NOTE - PROGRESS NOTE DETAILS
feeling improved after albuterol, rest of labs, EKG wnl, no acute ischemic changes +influenza A, feeling improved after albuterol, rest of labs, EKG wnl, no acute ischemic changes, will rx albuterol, antitussives on dc.

## 2023-07-27 NOTE — ED ADULT TRIAGE NOTE - SPO2 (%)
Render In Strict Bullet Format?: No Detail Level: Zone 97 Initiate Treatment: triamcinolone acetonide 0.1 % topical cream BID\\nQuantity: 45.0 g  Days Supply: 30\\nSig: Apply twice daily prn to rash on body x up to 2 weeks/month as needed. Do not use on face.\\n\\nValacyclovir three times daily X 1 week\\n\\nOTC Zyrtec qday as needed flares or pruritus

## 2023-09-07 NOTE — ED ADULT NURSE NOTE - SUICIDE SCREENING QUESTION 3
[FreeTextEntry3] :  Saw and examined patient and agree with plan with modifications.  Brandie Meadows MD [facility] Pediatric Orthopedic Surgery 
No

## 2024-05-17 NOTE — DISCHARGE NOTE PROVIDER - CARE PROVIDERS DIRECT ADDRESSES
,DirectAddress_Unknown,DirectAddress_Unknown IMPROVE-DD Application Not Available ,DirectAddress_Unknown,DirectAddress_Unknown,DirectAddress_Unknown ,DirectAddress_Unknown,DirectAddress_Unknown,[email or web address]

## 2024-07-31 NOTE — ED ADULT TRIAGE NOTE - RESPIRATORY RATE (BREATHS/MIN)
[de-identified] : Complete audiometry was ordered and completed today. This was separately reported by the audiologist. The results were reviewed in detail with the patient.  [de-identified] : MRI reviewed 18

## 2024-08-15 NOTE — ED PROVIDER NOTE - CARDIAC, MLM
[FreeTextEntry1] : EXAMINATION:  US SCROTUM TODAY IN OFFICE  FINDINGS: LEFT VARICOCELE WITH SYMMETRIC TESTES WITH NORMAL FLOW; UNREMARKABLE OTHER SCROTAL CONTENTS  Normal rate, regular rhythm.  Heart sounds S1, S2.  No murmurs, rubs or gallops.

## 2024-12-23 NOTE — ED ADULT NURSE NOTE - NS ED NURSE RECORD ANOTHER VITAL SIGN
I reviewed the H&P, I examined the patient, and there are no changes in the patient's condition.     Yes